# Patient Record
Sex: FEMALE | Race: WHITE | NOT HISPANIC OR LATINO | Employment: OTHER | URBAN - NONMETROPOLITAN AREA
[De-identification: names, ages, dates, MRNs, and addresses within clinical notes are randomized per-mention and may not be internally consistent; named-entity substitution may affect disease eponyms.]

---

## 2018-02-20 ENCOUNTER — HISTORICAL (OUTPATIENT)
Dept: ADMINISTRATIVE | Facility: HOSPITAL | Age: 83
End: 2018-02-20

## 2018-04-02 ENCOUNTER — HISTORICAL (OUTPATIENT)
Dept: ADMINISTRATIVE | Facility: HOSPITAL | Age: 83
End: 2018-04-02

## 2018-05-18 ENCOUNTER — HISTORICAL (OUTPATIENT)
Dept: ADMINISTRATIVE | Facility: HOSPITAL | Age: 83
End: 2018-05-18

## 2020-08-24 ENCOUNTER — HISTORICAL (OUTPATIENT)
Dept: ADMINISTRATIVE | Facility: HOSPITAL | Age: 85
End: 2020-08-24

## 2020-08-25 ENCOUNTER — HISTORICAL (OUTPATIENT)
Dept: ADMINISTRATIVE | Facility: HOSPITAL | Age: 85
End: 2020-08-25

## 2020-08-28 ENCOUNTER — HISTORICAL (OUTPATIENT)
Dept: ADMINISTRATIVE | Facility: HOSPITAL | Age: 85
End: 2020-08-28

## 2020-09-08 ENCOUNTER — HISTORICAL (OUTPATIENT)
Dept: ADMINISTRATIVE | Facility: HOSPITAL | Age: 85
End: 2020-09-08

## 2021-03-14 ENCOUNTER — HISTORICAL (OUTPATIENT)
Dept: ADMINISTRATIVE | Facility: HOSPITAL | Age: 86
End: 2021-03-14

## 2021-03-15 ENCOUNTER — HISTORICAL (OUTPATIENT)
Dept: ADMINISTRATIVE | Facility: HOSPITAL | Age: 86
End: 2021-03-15

## 2021-06-03 LAB
ALBUMIN SERPL-MCNC: 3.9 G/DL (ref 3.4–5)
ALBUMIN/GLOB SERPL: 1.3 {RATIO}
ALP SERPL-CCNC: 74 U/L (ref 50–144)
ALT SERPL-CCNC: 16 U/L (ref 1–45)
ANION GAP SERPL CALC-SCNC: 10 MMOL/L (ref 7–16)
AST SERPL-CCNC: 23 U/L (ref 14–36)
BASOPHILS # BLD AUTO: 0.03 X10(3)/MCL (ref 0.01–0.08)
BASOPHILS NFR BLD AUTO: 0.3 % (ref 0.1–1.2)
BILIRUB SERPL-MCNC: 0.54 MG/DL (ref 0.1–1)
BUN SERPL-MCNC: 36 MG/DL (ref 7–20)
CALCIUM SERPL-MCNC: 9.2 MG/DL (ref 8.4–10.2)
CHLORIDE SERPL-SCNC: 102 MMOL/L (ref 94–110)
CHOLEST SERPL-MCNC: 127 MG/DL (ref 0–200)
CO2 SERPL-SCNC: 26 MMOL/L (ref 21–32)
CREAT SERPL-MCNC: 1.1 MG/DL (ref 0.52–1.04)
CREAT/UREA NIT SERPL: 32.7 (ref 12–20)
EOSINOPHIL # BLD AUTO: 0.42 X10(3)/MCL (ref 0.04–0.36)
EOSINOPHIL NFR BLD AUTO: 4.6 % (ref 0.7–7)
ERYTHROCYTE [DISTWIDTH] IN BLOOD BY AUTOMATED COUNT: 15 % (ref 11–14.5)
EST CREAT CLEARANCE SER (OHS): 43.86 ML/MIN
GLOBULIN SER-MCNC: 3 G/DL (ref 2–3.9)
GLUCOSE SERPL-MCNC: 176 MGM./DL (ref 70–115)
HCT VFR BLD AUTO: 38.6 % (ref 36–48)
HDLC SERPL-MCNC: 43 MG/DL (ref 40–60)
HGB BLD-MCNC: 11.9 G/DL (ref 11.8–16)
IMM GRANULOCYTES # BLD AUTO: 0.04 X10E3/UL (ref 0–0.03)
IMM GRANULOCYTES NFR BLD AUTO: 0.4 % (ref 0–0.5)
LDLC SERPL CALC-MCNC: 54 MG/DL (ref 30–100)
LYMPHOCYTES # BLD AUTO: 2.21 X10(3)/MCL (ref 1.16–3.74)
LYMPHOCYTES NFR BLD AUTO: 24.4 % (ref 20–55)
MCH RBC QN AUTO: 27.8 PG (ref 27–34)
MCHC RBC AUTO-ENTMCNC: 30.8 G/DL (ref 31–37)
MCV RBC AUTO: 90.2 FL (ref 79–99)
MONOCYTES # BLD AUTO: 0.57 X10(3)/MCL (ref 0.24–0.36)
MONOCYTES NFR BLD AUTO: 6.3 % (ref 4.7–12.5)
NEUTROPHILS # BLD AUTO: 5.77 X10(3)/MCL (ref 1.56–6.13)
NEUTROPHILS NFR BLD AUTO: 64 % (ref 37–73)
PLATELET # BLD AUTO: 206 X10(3)/MCL (ref 140–371)
PMV BLD AUTO: 10.7 FL (ref 9.4–12.4)
POTASSIUM SERPL-SCNC: 4.2 MMOL/L (ref 3.5–5.1)
PROT SERPL-MCNC: 6.9 G/DL (ref 6.3–8.2)
RBC # BLD AUTO: 4.28 X10(6)/MCL (ref 4–5.1)
SODIUM SERPL-SCNC: 138 MMOL/L (ref 135–145)
TRIGL SERPL-MCNC: 107 MG/DL (ref 30–200)
TSH SERPL-ACNC: 5.41 UIU/ML (ref 0.36–3.74)
WBC # SPEC AUTO: 9 X10(3)/MCL (ref 4–11.5)

## 2021-09-15 LAB
EST. AVERAGE GLUCOSE BLD GHB EST-MCNC: 153 MG/DL (ref 70–115)
HBA1C MFR BLD: 7.1 % (ref 4–6)

## 2022-04-10 ENCOUNTER — HISTORICAL (OUTPATIENT)
Dept: ADMINISTRATIVE | Facility: HOSPITAL | Age: 87
End: 2022-04-10

## 2022-04-27 VITALS
WEIGHT: 170.88 LBS | OXYGEN SATURATION: 97 % | DIASTOLIC BLOOD PRESSURE: 56 MMHG | SYSTOLIC BLOOD PRESSURE: 130 MMHG | BODY MASS INDEX: 28.47 KG/M2 | HEIGHT: 65 IN

## 2022-05-01 ENCOUNTER — HISTORICAL (OUTPATIENT)
Dept: ADMINISTRATIVE | Facility: HOSPITAL | Age: 87
End: 2022-05-01

## 2022-05-03 NOTE — HISTORICAL [facility]
This is a historical note converted from Kelin. Formatting and pictures may have been removed.  Please reference Kelin for original formatting and attached multimedia. Chief Complaint  follow up, itching all over  History of Present Illness  87-year-old female here for follow-up of chronic medical conditions. ?See assessment plan for individual list of issues  Review of Systems  ? She has not been having any chest pain?since her hospitalization a few months back.  Physical Exam  Vitals & Measurements  T:?36.5? ?C (Oral)? HR:?76(Peripheral)? BP:?130/62? SpO2:?98%?  HT:?165.00?cm? WT:?77.100?kg? BMI:?28.32?  Elderly female in no distress  Heart with regular rate and rhythm  Assessment/Plan  1.?CVA, old, hemiparesis ? I69.359  ?She is on statin therapy along with Plavix and has not had any recent TIA type symptoms  2.?Diabetes ? E11.9  ?Metformin. ?We will check an A1c today, previous A1cs have been well controlled  3.?HTN - Hypertension ? I10  ?She is currently on?hydrochlorothiazide?and quinapril, blood pressure well controlled  4.?Hypothyroidism ? E03.9  ?She is on levothyroxine 75 mcg daily. ?We will check a TSH today  5.?Hyperlipidemia ? E78.5  ?On atorvastatin 10 mg daily  6.?Small bowel obstruction ? K56.609  ?She has not had any more bowel obstruction symptoms in the last 6 months.? She is on a puréed diet now because of some problems with her dentures and this is been having her bowels move very well lately.  7.?Anxiety ? F41.9  ?She has been a little more anxious lately?can still on her Zoloft?100 mg nightly along with mirtazapine 7.5 mg at night  Orders:  mirtazapine, See Instructions, TAKE 1 TABLET BY MOUTH EVERYDAY AT BEDTIME, # 90 tab(s), 1 Refill(s), Pharmacy: [id number], 165, cm, Height/Length Dosing, 06/03/21 11:45:00 CDT, 77.1, kg, Weight Dosing, 06/03/21 11:45:00 CDT  Clinic Follow up, *Est. 09/03/21 10:00:00 CDT, Order for future visit, Hyperlipidemia, [facility]  Office/Outpatient Visit Level 4 Established [id number] PC, CVA, old, hemiparesis  Diabetes  HTN - Hypertension  Hypothyroidism  Hyperlipidemia, [facility], 06/03/21 11:55:00 CDT  CBC, CMP, TSH, lipids, hemoglobin A1c today  Follow-up in 3 months  Referrals  Clinic Follow up, *Est. 09/03/21 10:00:00 CDT, Order for future visit, Hyperlipidemia, [facility]   Problem List/Past Medical History  Ongoing  Anxiety  Arthritis  Chest pain  CVA, old, hemiparesis  Diabetes  Heartburn  Hemiplegia of left nondominant side  HTN - Hypertension  Hyperlipidemia  Hypothyroidism  Recurrent major depressive disorder  Seborrheic keratosis  Small bowel obstruction  Historical  No qualifying data  Procedure/Surgical History  Implantable neurostimulator, pulse generator, any type (05/25/2016)  Insertion of Single Array Stimulator Generator into Back Subcutaneous Tissue and Fascia, Open Approach (05/25/2016)  Insertion or replacement of peripheral or gastric neurostimulator pulse generator or , direct or inductive coupling (05/25/2016)  Sacral Neuromodulation Complete (None) (05/25/2016)  Excision of basal cell carcinoma (2016)  Suspension of bladder (2016)  Incision and exploration of kidney (1992)  Appendectomy;  Biopsy of breast; open, incisional  Cholecystectomy;  Cholecystectomy;  Extracapsular cataract removal with insertion of intraocular lens prosthesis (1 stage procedure), manual or mechanical technique (eg, irrigation and aspiration or phacoemulsification)  Laparoscopy, surgical, with total hysterectomy, for uterus 250 g or less;   Medications  amlodipine 5 mg oral tablet, See Instructions  atorvastatin 10 mg oral tablet, See Instructions  clopidogrel 75 mg oral tablet, See Instructions  glipiZIDE 10 mg oral tablet, See Instructions, 1 refills  hydrochlorothiazide 25 mg oral tablet, See Instructions  levocetirizine 5 mg tablet, 5 mg= 1 tab(s), Oral, Daily  levothyroxine 75 mcg  (0.075 mg) oral tablet, See Instructions, 1 refills  METFORMIN TAB 500MG ER, 500 mg= 1 tab(s), Oral, Daily  MiraLax (polyethylene glycol 3350), 17 gm, Oral, BID  mirtazapine 7.5 mg oral tablet, See Instructions, 1 refills  Pantoprazole 40 mg ORAL EC-Tablet, 40 mg= 1 tab(s), Oral, Daily, 1 refills  potassium chloride 20 mEq oral TABLET extended release, 20 mEq, Oral, BID  quinapril 20 mg oral tablet, 20 mg= 1 tab(s), Oral, BID, 1 refills  sertraline 100 mg oral tablet, See Instructions  spironolactone 25 mg oral tablet, 25 mg= 1 tab(s), Oral, Daily, 1 refills  traZODONE 50 mg oral tablet ( Desyrel ), 50 mg= 1 tab(s), Oral, Once a day (at bedtime), 1 refills  Allergies  Clindamycin Hydrochloride?(Unknown)  Povidone-Iodine  contrast media (iodine-based)?(Unknown)  Social History  Abuse/Neglect  No, 03/24/2021  Alcohol  Never, 05/23/2016  Home/Environment  Lives with Significant other., 05/23/2016  Substance Use  Never, 05/23/2016  Tobacco  Never (less than 100 in lifetime), N/A, 03/24/2021  Never smoker, 05/23/2016  Family History  Diabetes mellitus type 1.: Negative: Mother.  Diabetes mellitus type 2: Mother and Mother.  Heart attack: Mother and Father.  Heart failure.: Mother and Father.  Immunizations  Vaccine Date Status Comments   COVID-19 mRNA, LNP-S, PF - Moderna 02/13/2021 Recorded    COVID-19 mRNA, LNP-S, PF - Moderna 01/16/2021 Recorded    influenza virus vaccine, inactivated 10/01/2020 Given    influenza virus vaccine, inactivated 10/02/2019 Recorded    influenza virus vaccine, inactivated 09/20/2018 Recorded    influenza virus vaccine, inactivated 09/05/2017 Recorded    pneumococcal 13-valent conjugate vaccine 10/20/2016 Recorded    influenza virus vaccine, inactivated 09/02/2016 Recorded    influenza virus vaccine, inactivated 09/09/2015 Recorded    influenza virus vaccine, inactivated 09/01/2014 Recorded    zoster vaccine live 07/26/2013 Recorded    influenza virus vaccine, inactivated 10/10/2011 Recorded  2020-10-01: UNKNOWN CAMPAIGNID   Health Maintenance  Health Maintenance  ???Pending?(in the next year)  ??? ??Due?  ??? ? ? ?ADL Screening due??06/03/21??and every 1??year(s)  ??? ? ? ?Bone Density Screening due??06/03/21??Variable frequency  ??? ? ? ?Diabetes Maintenance-Foot Exam due??06/03/21??Unknown Frequency  ??? ? ? ?Hypertension Management-Education due??06/03/21??and every 1??year(s)  ??? ? ? ?Medicare Annual Wellness Exam due??06/03/21??and every 1??year(s)  ??? ? ? ?Tetanus Vaccine due??06/03/21??and every 10??year(s)  ??? ? ? ?Zoster Vaccine due??06/03/21??Unknown Frequency  ??? ??Due In Future?  ??? ? ? ?Diabetes Maintenance-HgbA1c not due until??09/04/21??and every 1??year(s)  ??? ? ? ?Influenza Vaccine not due until??10/01/21??and every 1??day(s)  ??? ? ? ?Diabetes Maintenance-Eye Exam not due until??10/19/21??and every 1??year(s)  ??? ? ? ?Obesity Screening not due until??01/01/22??and every 1??year(s)  ??? ? ? ?Advance Directive not due until??01/02/22??and every 1??year(s)  ??? ? ? ?Cognitive Screening not due until??01/02/22??and every 1??year(s)  ??? ? ? ?Fall Risk Assessment not due until??01/02/22??and every 1??year(s)  ??? ? ? ?Functional Assessment not due until??01/02/22??and every 1??year(s)  ??? ? ? ?Hypertension Management-BMP not due until??03/15/22??and every 1??year(s)  ???Satisfied?(in the past 1 year)  ??? ??Satisfied?  ??? ? ? ?Advance Directive on??06/03/21.??Satisfied by Lsia Melgar LPN  ??? ? ? ?Blood Pressure Screening on??06/03/21.??Satisfied by Lisa Melgar LPN  ??? ? ? ?Body Mass Index Check on??06/03/21.??Satisfied by Lisa Melgar LPN  ??? ? ? ?Cognitive Screening on??06/03/21.??Satisfied by Lisa Melgar LPN  ??? ? ? ?Coronary Artery Disease Maintenance-Lipid Lowering Therapy on??02/22/21.??Satisfied by Mitchel Stewart MD  ??? ? ? ?Depression Screening on??06/03/21.??Satisfied by Lisa Melgar LPN  ??? ? ? ?Fall Risk Assessment  on??06/03/21.??Satisfied by Lisa Melgar LPN  ??? ? ? ?Functional Assessment on??06/03/21.??Satisfied by Lisa Melgar LPN  ??? ? ? ?Hypertension Management-Blood Pressure on??06/03/21.??Satisfied by Lisa Melgar LPN  ??? ? ? ?Influenza Vaccine on??10/01/20.??Satisfied by Thais Melgar  ??? ? ? ?Obesity Screening on??06/03/21.??Satisfied by Lisa Melgar LPN  ?

## 2023-02-13 DIAGNOSIS — F33.9 EPISODE OF RECURRENT MAJOR DEPRESSIVE DISORDER, UNSPECIFIED DEPRESSION EPISODE SEVERITY: ICD-10-CM

## 2023-02-13 DIAGNOSIS — E78.5 HYPERLIPIDEMIA, UNSPECIFIED HYPERLIPIDEMIA TYPE: ICD-10-CM

## 2023-02-13 DIAGNOSIS — F41.9 ANXIETY: Primary | ICD-10-CM

## 2023-02-13 RX ORDER — SERTRALINE HYDROCHLORIDE 100 MG/1
100 TABLET, FILM COATED ORAL DAILY
Qty: 90 TABLET | Refills: 0 | Status: SHIPPED | OUTPATIENT
Start: 2023-02-13 | End: 2023-05-12

## 2023-02-13 RX ORDER — ATORVASTATIN CALCIUM 10 MG/1
TABLET, FILM COATED ORAL
COMMUNITY
Start: 2022-11-02 | End: 2023-02-13 | Stop reason: SDUPTHER

## 2023-02-13 RX ORDER — MIRTAZAPINE 15 MG/1
TABLET, FILM COATED ORAL
COMMUNITY
Start: 2022-08-30 | End: 2023-02-13 | Stop reason: SDUPTHER

## 2023-02-13 RX ORDER — MIRTAZAPINE 15 MG/1
15 TABLET, FILM COATED ORAL NIGHTLY
Qty: 90 TABLET | Refills: 0 | Status: SHIPPED | OUTPATIENT
Start: 2023-02-13 | End: 2023-05-22

## 2023-02-13 RX ORDER — ATORVASTATIN CALCIUM 10 MG/1
10 TABLET, FILM COATED ORAL NIGHTLY
Qty: 90 TABLET | Refills: 0 | Status: SHIPPED | OUTPATIENT
Start: 2023-02-13 | End: 2023-05-12

## 2023-02-13 RX ORDER — SERTRALINE HYDROCHLORIDE 100 MG/1
TABLET, FILM COATED ORAL
COMMUNITY
Start: 2022-11-02 | End: 2023-02-13 | Stop reason: SDUPTHER

## 2023-03-14 ENCOUNTER — LAB REQUISITION (OUTPATIENT)
Dept: LAB | Facility: HOSPITAL | Age: 88
End: 2023-03-14
Payer: MEDICARE

## 2023-03-14 DIAGNOSIS — E11.9 TYPE 2 DIABETES MELLITUS WITHOUT COMPLICATIONS: ICD-10-CM

## 2023-03-14 LAB
EST. AVERAGE GLUCOSE BLD GHB EST-MCNC: 171.4 MG/DL (ref 70–115)
HBA1C MFR BLD: 7.6 % (ref 4–6)

## 2023-03-14 PROCEDURE — 83036 HEMOGLOBIN GLYCOSYLATED A1C: CPT | Performed by: FAMILY MEDICINE

## 2023-04-04 ENCOUNTER — OFFICE VISIT (OUTPATIENT)
Dept: FAMILY MEDICINE | Facility: CLINIC | Age: 88
End: 2023-04-04
Payer: MEDICARE

## 2023-04-04 VITALS
TEMPERATURE: 98 F | BODY MASS INDEX: 27.86 KG/M2 | HEART RATE: 86 BPM | WEIGHT: 167.19 LBS | OXYGEN SATURATION: 97 % | DIASTOLIC BLOOD PRESSURE: 58 MMHG | HEIGHT: 65 IN | SYSTOLIC BLOOD PRESSURE: 130 MMHG

## 2023-04-04 DIAGNOSIS — E78.2 MIXED HYPERLIPIDEMIA: ICD-10-CM

## 2023-04-04 DIAGNOSIS — E11.9 TYPE 2 DIABETES MELLITUS WITHOUT COMPLICATION, WITHOUT LONG-TERM CURRENT USE OF INSULIN: ICD-10-CM

## 2023-04-04 DIAGNOSIS — I69.359 HEMIPARESIS AS LATE EFFECT OF CEREBROVASCULAR ACCIDENT (CVA): Primary | ICD-10-CM

## 2023-04-04 DIAGNOSIS — K21.9 GASTROESOPHAGEAL REFLUX DISEASE, UNSPECIFIED WHETHER ESOPHAGITIS PRESENT: Primary | ICD-10-CM

## 2023-04-04 DIAGNOSIS — R26.0 ATAXIC GAIT: ICD-10-CM

## 2023-04-04 DIAGNOSIS — E03.9 ACQUIRED HYPOTHYROIDISM: ICD-10-CM

## 2023-04-04 DIAGNOSIS — I10 PRIMARY HYPERTENSION: ICD-10-CM

## 2023-04-04 DIAGNOSIS — F41.9 ANXIETY: ICD-10-CM

## 2023-04-04 DIAGNOSIS — F33.41 RECURRENT MAJOR DEPRESSIVE DISORDER, IN PARTIAL REMISSION: ICD-10-CM

## 2023-04-04 DIAGNOSIS — E55.9 VITAMIN D DEFICIENCY: ICD-10-CM

## 2023-04-04 DIAGNOSIS — I69.354 HEMIPLEGIA OF LEFT NONDOMINANT SIDE AS LATE EFFECT OF CEREBRAL INFARCTION, UNSPECIFIED HEMIPLEGIA TYPE: ICD-10-CM

## 2023-04-04 PROBLEM — F33.9 RECURRENT MAJOR DEPRESSIVE DISORDER: Status: ACTIVE | Noted: 2023-04-04

## 2023-04-04 PROBLEM — E78.5 HYPERLIPIDEMIA: Status: ACTIVE | Noted: 2023-04-04

## 2023-04-04 PROBLEM — G81.94 HEMIPLEGIA AFFECTING LEFT NONDOMINANT SIDE: Status: ACTIVE | Noted: 2023-04-04

## 2023-04-04 PROCEDURE — 99214 OFFICE O/P EST MOD 30 MIN: CPT | Mod: ,,, | Performed by: FAMILY MEDICINE

## 2023-04-04 PROCEDURE — 99214 PR OFFICE/OUTPT VISIT, EST, LEVL IV, 30-39 MIN: ICD-10-PCS | Mod: ,,, | Performed by: FAMILY MEDICINE

## 2023-04-04 RX ORDER — AMLODIPINE BESYLATE 5 MG/1
1 TABLET ORAL DAILY
COMMUNITY
Start: 2023-02-20 | End: 2023-04-24 | Stop reason: SDUPTHER

## 2023-04-04 RX ORDER — LEVOTHYROXINE SODIUM 75 UG/1
1 TABLET ORAL DAILY
COMMUNITY
Start: 2021-12-13 | End: 2023-05-12

## 2023-04-04 RX ORDER — CLOPIDOGREL BISULFATE 75 MG/1
1 TABLET ORAL DAILY
COMMUNITY
Start: 2022-11-02

## 2023-04-04 RX ORDER — TRAZODONE HYDROCHLORIDE 50 MG/1
50 TABLET ORAL NIGHTLY
COMMUNITY
Start: 2022-04-11 | End: 2023-04-24 | Stop reason: SDUPTHER

## 2023-04-04 RX ORDER — PANTOPRAZOLE SODIUM 40 MG/1
40 TABLET, DELAYED RELEASE ORAL DAILY
COMMUNITY
Start: 2022-04-01 | End: 2023-04-04 | Stop reason: SDUPTHER

## 2023-04-04 RX ORDER — SPIRONOLACTONE 25 MG/1
25 TABLET ORAL DAILY
COMMUNITY
Start: 2022-05-03 | End: 2023-04-25

## 2023-04-04 RX ORDER — PANTOPRAZOLE SODIUM 40 MG/1
40 TABLET, DELAYED RELEASE ORAL DAILY
Qty: 90 TABLET | Refills: 3 | Status: SHIPPED | OUTPATIENT
Start: 2023-04-04

## 2023-04-04 NOTE — PROGRESS NOTES
"SUBJECTIVE:  Mariajose Allen is a 89 y.o. female here for 3 month f/u      HPI  Here for follow-up on chronic medical conditions.  See assessment and plan for individual list of issues.  Mariajose's allergies, medications, history, and problem list were updated as appropriate.    Review of Systems   He is doing pretty well at this time.  No real complaints.  She says she has the usual problems of a 89-year-old.    No results found for this or any previous visit (from the past 504 hour(s)).    OBJECTIVE:  Vital signs  Vitals:    04/04/23 1356 04/04/23 1427   BP: (!) 160/64 (!) 130/58   BP Location: Right arm Right arm   Pulse: 86    Temp: 98 °F (36.7 °C)    TempSrc: Oral    SpO2: 97%    Weight: 75.8 kg (167 lb 3.2 oz)    Height: 5' 4.57" (1.64 m)         Physical Exam heart with a regular rate and rhythm, lungs are clear.  She is able to walk with the assistance of a walker today    ASSESSMENT/PLAN:  1. Hemiparesis as late effect of cerebrovascular accident (CVA)  On statin therapy along with Plavix    2. 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 Recurrent major depressive disorder, in partial remission  Currently on mirtazapine and Zoloft 100 mg daily.  We will try reducing the Zoloft to 50 mg daily and see how she does with this and continue the mirtazapine 15 mg at night    4. Anxiety  Stable    5. Mixed hyperlipidemia  On atorvastatin  Overview:  Lab Results   Component Value Date    CHOL 127 06/03/2021    HDL 43 06/03/2021    TRIG 107 06/03/2021         6. Primary hypertension  Blood pressure well controlled on amlodipine and lisinopril and spironolactone    7. Type 2 diabetes mellitus without complication, without long-term current use of insulin  A1c 7.6 which is very good for her age.  She is on glipizide which is not the best option for someone her age but she is done well with it with no signs of hypoglycemia and so this will be continued for " now  Overview:  Lab Results   Component Value Date    HGBA1C 7.6 (H) 03/14/2023    HGBA1C 7.1 (H) 09/15/2021          8. Acquired hypothyroidism  On levothyroxine 75 mcg daily         Follow Up:  Follow up in about 3 months (around 7/4/2023) for recheck, nonfasting labs.

## 2023-04-24 DIAGNOSIS — I10 PRIMARY HYPERTENSION: ICD-10-CM

## 2023-04-24 DIAGNOSIS — F41.9 ANXIETY: Primary | ICD-10-CM

## 2023-04-24 RX ORDER — TRAZODONE HYDROCHLORIDE 50 MG/1
50 TABLET ORAL NIGHTLY
Qty: 90 TABLET | Refills: 1 | Status: SHIPPED | OUTPATIENT
Start: 2023-04-24

## 2023-04-24 RX ORDER — AMLODIPINE BESYLATE 5 MG/1
5 TABLET ORAL DAILY
Qty: 90 TABLET | Refills: 1 | Status: SHIPPED | OUTPATIENT
Start: 2023-04-24

## 2023-05-12 DIAGNOSIS — E03.9 ACQUIRED HYPOTHYROIDISM: Primary | ICD-10-CM

## 2023-05-12 DIAGNOSIS — E78.5 HYPERLIPIDEMIA, UNSPECIFIED HYPERLIPIDEMIA TYPE: ICD-10-CM

## 2023-05-12 DIAGNOSIS — F41.9 ANXIETY: ICD-10-CM

## 2023-05-12 RX ORDER — LEVOTHYROXINE SODIUM 75 UG/1
TABLET ORAL
Qty: 90 TABLET | Refills: 1 | Status: SHIPPED | OUTPATIENT
Start: 2023-05-12

## 2023-05-12 RX ORDER — ATORVASTATIN CALCIUM 10 MG/1
TABLET, FILM COATED ORAL
Qty: 90 TABLET | Refills: 0 | Status: SHIPPED | OUTPATIENT
Start: 2023-05-12 | End: 2023-08-08

## 2023-05-12 RX ORDER — SERTRALINE HYDROCHLORIDE 100 MG/1
TABLET, FILM COATED ORAL
Qty: 90 TABLET | Refills: 0 | Status: SHIPPED | OUTPATIENT
Start: 2023-05-12 | End: 2023-06-05

## 2023-05-22 DIAGNOSIS — F33.9 EPISODE OF RECURRENT MAJOR DEPRESSIVE DISORDER, UNSPECIFIED DEPRESSION EPISODE SEVERITY: ICD-10-CM

## 2023-05-22 RX ORDER — MIRTAZAPINE 15 MG/1
TABLET, FILM COATED ORAL
Qty: 90 TABLET | Refills: 0 | Status: SHIPPED | OUTPATIENT
Start: 2023-05-22 | End: 2023-08-21

## 2023-05-24 ENCOUNTER — TELEPHONE (OUTPATIENT)
Dept: FAMILY MEDICINE | Facility: CLINIC | Age: 88
End: 2023-05-24
Payer: MEDICARE

## 2023-05-24 NOTE — TELEPHONE ENCOUNTER
Misty called stating pt has had abd cramping for a whole week. No other symptoms.         [phone]

## 2023-06-04 DIAGNOSIS — F41.9 ANXIETY: ICD-10-CM

## 2023-06-05 RX ORDER — SERTRALINE HYDROCHLORIDE 50 MG/1
50 TABLET, FILM COATED ORAL DAILY
Qty: 90 TABLET | Refills: 1 | Status: SHIPPED | OUTPATIENT
Start: 2023-06-05 | End: 2023-07-18

## 2023-06-21 ENCOUNTER — LAB REQUISITION (OUTPATIENT)
Dept: LAB | Facility: HOSPITAL | Age: 88
End: 2023-06-21
Payer: MEDICARE

## 2023-06-21 DIAGNOSIS — E11.9 TYPE 2 DIABETES MELLITUS WITHOUT COMPLICATIONS: ICD-10-CM

## 2023-06-21 LAB
EST. AVERAGE GLUCOSE BLD GHB EST-MCNC: 234.6 MG/DL (ref 70–115)
HBA1C MFR BLD: 9.8 % (ref 4–6)

## 2023-06-21 PROCEDURE — 83036 HEMOGLOBIN GLYCOSYLATED A1C: CPT | Performed by: FAMILY MEDICINE

## 2023-07-10 ENCOUNTER — TELEPHONE (OUTPATIENT)
Dept: FAMILY MEDICINE | Facility: CLINIC | Age: 88
End: 2023-07-10
Payer: MEDICARE

## 2023-07-17 ENCOUNTER — LAB REQUISITION (OUTPATIENT)
Dept: LAB | Facility: HOSPITAL | Age: 88
End: 2023-07-17
Payer: MEDICARE

## 2023-07-17 DIAGNOSIS — E11.9 TYPE 2 DIABETES MELLITUS WITHOUT COMPLICATIONS: ICD-10-CM

## 2023-07-17 DIAGNOSIS — I10 ESSENTIAL (PRIMARY) HYPERTENSION: ICD-10-CM

## 2023-07-17 LAB
ALBUMIN SERPL-MCNC: 3.5 G/DL (ref 3.4–5)
ALBUMIN/GLOB SERPL: 1.3 RATIO
ALP SERPL-CCNC: 69 UNIT/L (ref 50–144)
ALT SERPL-CCNC: 15 UNIT/L (ref 1–45)
ANION GAP SERPL CALC-SCNC: 4 MEQ/L (ref 2–13)
AST SERPL-CCNC: 21 UNIT/L (ref 14–36)
BASOPHILS # BLD AUTO: 0.05 X10(3)/MCL (ref 0.01–0.08)
BASOPHILS NFR BLD AUTO: 0.6 % (ref 0.1–1.2)
BILIRUBIN DIRECT+TOT PNL SERPL-MCNC: 0.3 MG/DL (ref 0–1)
BUN SERPL-MCNC: 33 MG/DL (ref 7–20)
CALCIUM SERPL-MCNC: 8.9 MG/DL (ref 8.4–10.2)
CHLORIDE SERPL-SCNC: 104 MMOL/L (ref 98–110)
CHOLEST SERPL-MCNC: 97 MG/DL (ref 0–200)
CO2 SERPL-SCNC: 28 MMOL/L (ref 21–32)
CREAT SERPL-MCNC: 1.44 MG/DL (ref 0.66–1.25)
CREAT/UREA NIT SERPL: 23 (ref 12–20)
DEPRECATED CALCIDIOL+CALCIFEROL SERPL-MC: 22.4 NG/ML (ref 30–100)
EOSINOPHIL # BLD AUTO: 0.34 X10(3)/MCL (ref 0.04–0.36)
EOSINOPHIL NFR BLD AUTO: 3.8 % (ref 0.7–7)
ERYTHROCYTE [DISTWIDTH] IN BLOOD BY AUTOMATED COUNT: 13.5 % (ref 11–14.5)
EST. AVERAGE GLUCOSE BLD GHB EST-MCNC: 217.3 MG/DL (ref 70–115)
GFR SERPLBLD CREATININE-BSD FMLA CKD-EPI: 35 MLS/MIN/1.73/M2
GLOBULIN SER-MCNC: 2.7 GM/DL (ref 2–3.9)
GLUCOSE SERPL-MCNC: 232 MG/DL (ref 70–115)
HBA1C MFR BLD: 9.2 % (ref 4–6)
HCT VFR BLD AUTO: 34 % (ref 36–48)
HDLC SERPL-MCNC: 41 MG/DL (ref 40–60)
HGB BLD-MCNC: 11.2 G/DL (ref 11.8–16)
IMM GRANULOCYTES # BLD AUTO: 0.03 X10(3)/MCL (ref 0–0.03)
IMM GRANULOCYTES NFR BLD AUTO: 0.3 % (ref 0–0.5)
LDLC SERPL DIRECT ASSAY-SCNC: 39.9 MG/DL (ref 30–100)
LYMPHOCYTES # BLD AUTO: 3.2 X10(3)/MCL (ref 1.16–3.74)
LYMPHOCYTES NFR BLD AUTO: 36.1 % (ref 20–55)
MCH RBC QN AUTO: 29.6 PG (ref 27–34)
MCHC RBC AUTO-ENTMCNC: 32.9 G/DL (ref 31–37)
MCV RBC AUTO: 89.7 FL (ref 79–99)
MONOCYTES # BLD AUTO: 0.6 X10(3)/MCL (ref 0.24–0.36)
MONOCYTES NFR BLD AUTO: 6.8 % (ref 4.7–12.5)
NEUTROPHILS # BLD AUTO: 4.65 X10(3)/MCL (ref 1.56–6.13)
NEUTROPHILS NFR BLD AUTO: 52.4 % (ref 37–73)
NRBC BLD AUTO-RTO: 0 %
PLATELET # BLD AUTO: 202 X10(3)/MCL (ref 140–371)
PMV BLD AUTO: 10.2 FL (ref 9.4–12.4)
POTASSIUM SERPL-SCNC: 5.4 MMOL/L (ref 3.5–5.1)
PROT SERPL-MCNC: 6.2 GM/DL (ref 6.3–8.2)
RBC # BLD AUTO: 3.79 X10(6)/MCL (ref 4–5.1)
SODIUM SERPL-SCNC: 136 MMOL/L (ref 135–145)
TRIGL SERPL-MCNC: 130 MG/DL (ref 30–200)
TSH SERPL-ACNC: 4.43 UIU/ML (ref 0.36–3.74)
VIT B12 SERPL-MCNC: 249 PG/ML (ref 211–946)
WBC # SPEC AUTO: 8.87 X10(3)/MCL (ref 4–11.5)

## 2023-07-17 PROCEDURE — 82607 VITAMIN B-12: CPT | Performed by: FAMILY MEDICINE

## 2023-07-17 PROCEDURE — 80061 LIPID PANEL: CPT | Performed by: FAMILY MEDICINE

## 2023-07-17 PROCEDURE — 84443 ASSAY THYROID STIM HORMONE: CPT | Performed by: FAMILY MEDICINE

## 2023-07-17 PROCEDURE — 85025 COMPLETE CBC W/AUTO DIFF WBC: CPT | Performed by: FAMILY MEDICINE

## 2023-07-17 PROCEDURE — 82306 VITAMIN D 25 HYDROXY: CPT | Performed by: FAMILY MEDICINE

## 2023-07-17 PROCEDURE — 83036 HEMOGLOBIN GLYCOSYLATED A1C: CPT | Performed by: FAMILY MEDICINE

## 2023-07-17 PROCEDURE — 80053 COMPREHEN METABOLIC PANEL: CPT | Performed by: FAMILY MEDICINE

## 2023-07-18 ENCOUNTER — OFFICE VISIT (OUTPATIENT)
Dept: FAMILY MEDICINE | Facility: CLINIC | Age: 88
End: 2023-07-18
Payer: MEDICARE

## 2023-07-18 VITALS
HEART RATE: 94 BPM | SYSTOLIC BLOOD PRESSURE: 128 MMHG | WEIGHT: 157 LBS | DIASTOLIC BLOOD PRESSURE: 54 MMHG | HEIGHT: 65 IN | BODY MASS INDEX: 26.16 KG/M2 | OXYGEN SATURATION: 97 %

## 2023-07-18 DIAGNOSIS — E11.9 TYPE 2 DIABETES MELLITUS WITHOUT COMPLICATION, WITHOUT LONG-TERM CURRENT USE OF INSULIN: ICD-10-CM

## 2023-07-18 DIAGNOSIS — N18.32 STAGE 3B CHRONIC KIDNEY DISEASE: ICD-10-CM

## 2023-07-18 DIAGNOSIS — S31.000A WOUND OF SACRAL REGION, INITIAL ENCOUNTER: ICD-10-CM

## 2023-07-18 DIAGNOSIS — F33.41 RECURRENT MAJOR DEPRESSIVE DISORDER, IN PARTIAL REMISSION: ICD-10-CM

## 2023-07-18 DIAGNOSIS — E11.51 TYPE 2 DIABETES MELLITUS WITH DIABETIC PERIPHERAL ANGIOPATHY WITHOUT GANGRENE, WITHOUT LONG-TERM CURRENT USE OF INSULIN: ICD-10-CM

## 2023-07-18 DIAGNOSIS — F41.9 ANXIETY: ICD-10-CM

## 2023-07-18 DIAGNOSIS — I10 PRIMARY HYPERTENSION: ICD-10-CM

## 2023-07-18 DIAGNOSIS — E03.9 ACQUIRED HYPOTHYROIDISM: ICD-10-CM

## 2023-07-18 DIAGNOSIS — I69.359 HEMIPARESIS AS LATE EFFECT OF CEREBROVASCULAR ACCIDENT (CVA): Primary | ICD-10-CM

## 2023-07-18 DIAGNOSIS — I69.354 HEMIPLEGIA OF LEFT NONDOMINANT SIDE AS LATE EFFECT OF CEREBRAL INFARCTION, UNSPECIFIED HEMIPLEGIA TYPE: ICD-10-CM

## 2023-07-18 DIAGNOSIS — E11.9 TYPE 2 DIABETES MELLITUS WITHOUT COMPLICATION, UNSPECIFIED WHETHER LONG TERM INSULIN USE: ICD-10-CM

## 2023-07-18 DIAGNOSIS — E78.2 MIXED HYPERLIPIDEMIA: ICD-10-CM

## 2023-07-18 PROCEDURE — 99214 PR OFFICE/OUTPT VISIT, EST, LEVL IV, 30-39 MIN: ICD-10-PCS | Mod: ,,, | Performed by: FAMILY MEDICINE

## 2023-07-18 PROCEDURE — 99214 OFFICE O/P EST MOD 30 MIN: CPT | Mod: ,,, | Performed by: FAMILY MEDICINE

## 2023-07-18 RX ORDER — MAGNESIUM 200 MG
1 TABLET ORAL NIGHTLY
COMMUNITY

## 2023-07-18 RX ORDER — DOXYCYCLINE HYCLATE 100 MG
100 TABLET ORAL 2 TIMES DAILY
Qty: 60 TABLET | Refills: 0 | Status: SHIPPED | OUTPATIENT
Start: 2023-07-18 | End: 2023-08-17

## 2023-07-18 RX ORDER — GLIPIZIDE 5 MG/1
10 TABLET ORAL DAILY
Qty: 180 TABLET | Refills: 1 | Status: SHIPPED | OUTPATIENT
Start: 2023-07-18 | End: 2024-01-14

## 2023-07-18 RX ORDER — SERTRALINE HYDROCHLORIDE 100 MG/1
100 TABLET, FILM COATED ORAL DAILY
Qty: 90 TABLET | Refills: 1 | Status: SHIPPED | OUTPATIENT
Start: 2023-07-18 | End: 2024-01-14

## 2023-07-18 NOTE — PROGRESS NOTES
SUBJECTIVE:  Mariajose Allen is a 89 y.o. female here for Follow-up (labs)      HPI  Patient is here for follow-up on chronic medical conditions.  See assessment and plan for individual list of issues.  Mariajose's allergies, medications, history, and problem list were updated as appropriate.    Review of Systems   She is been more tired lately.  She is developed some pressure sores on her buttocks that are getting worse  Recent Results (from the past 504 hour(s))   TSH    Collection Time: 07/17/23  8:30 AM   Result Value Ref Range    Thyroid Stimulating Hormone 4.430 (H) 0.360 - 3.740 uIU/mL   Vitamin B12    Collection Time: 07/17/23  8:30 AM   Result Value Ref Range    Vitamin B12 Level 249 211 - 946 pg/mL   Comprehensive Metabolic Panel    Collection Time: 07/17/23  8:30 AM   Result Value Ref Range    Sodium Level 136 135 - 145 mmol/L    Potassium Level 5.4 (H) 3.5 - 5.1 mmol/L    Chloride 104 98 - 110 mmol/L    Carbon Dioxide 28 21 - 32 mmol/L    Glucose Level 232 (H) 70 - 115 mg/dL    Blood Urea Nitrogen 33.0 (H) 7.0 - 20.0 mg/dL    Creatinine 1.44 (H) 0.66 - 1.25 mg/dL    Calcium Level Total 8.9 8.4 - 10.2 mg/dL    Protein Total 6.2 (L) 6.3 - 8.2 gm/dL    Albumin Level 3.5 3.4 - 5.0 g/dL    Globulin 2.7 2.0 - 3.9 gm/dL    Albumin/Globulin Ratio 1.3 ratio    Bilirubin Total 0.3 0.0 - 1.0 mg/dL    Alkaline Phosphatase 69 50 - 144 unit/L    Alanine Aminotransferase 15 1 - 45 unit/L    Aspartate Aminotransferase 21 14 - 36 unit/L    eGFR 35 mls/min/1.73/m2    Anion Gap 4.0 2.0 - 13.0 mEq/L    BUN/Creatinine Ratio 23 (H) 12 - 20   Hemoglobin A1C    Collection Time: 07/17/23  8:30 AM   Result Value Ref Range    Hemoglobin A1c 9.2 (H) 4.0 - 6.0 %    Estimated Average Glucose 217.3 (H) 70.0 - 115.0 mg/dL   Lipid Panel    Collection Time: 07/17/23  8:30 AM   Result Value Ref Range    Cholesterol Total 97 0 - 200 mg/dL    HDL Cholesterol 41 40 - 60 mg/dL    Triglyceride 130 30 - 200 mg/dL    LDL Cholesterol Direct 39.9 30.0 -  "100.0 mg/dL   Vitamin D    Collection Time: 07/17/23  8:30 AM   Result Value Ref Range    Vit D 25 OH 22.4 (L) 30.0 - 100.0 ng/mL   CBC with Differential    Collection Time: 07/17/23  8:30 AM   Result Value Ref Range    WBC 8.87 4.00 - 11.50 x10(3)/mcL    RBC 3.79 (L) 4.00 - 5.10 x10(6)/mcL    Hgb 11.2 (L) 11.8 - 16.0 g/dL    Hct 34.0 (L) 36.0 - 48.0 %    MCV 89.7 79.0 - 99.0 fL    MCH 29.6 27.0 - 34.0 pg    MCHC 32.9 31.0 - 37.0 g/dL    RDW 13.5 11.0 - 14.5 %    Platelet 202 140 - 371 x10(3)/mcL    MPV 10.2 9.4 - 12.4 fL    Neut % 52.4 37 - 73 %    Lymph % 36.1 20 - 55 %    Mono % 6.8 4.7 - 12.5 %    Eos % 3.8 0.7 - 7 %    Basophil % 0.6 0.1 - 1.2 %    Lymph # 3.20 1.16 - 3.74 x10(3)/mcL    Neut # 4.65 1.56 - 6.13 x10(3)/mcL    Mono # 0.60 (H) 0.24 - 0.36 x10(3)/mcL    Eos # 0.34 0.04 - 0.36 x10(3)/mcL    Baso # 0.05 0.01 - 0.08 x10(3)/mcL    IG# 0.03 0.0001 - 0.031 x10(3)/mcL    IG% 0.3 0 - 0.5 %    NRBC% 0.0 <=1 %       OBJECTIVE:  Vital signs  Vitals:    07/18/23 1357 07/18/23 1400   BP: (!) 154/60 (!) 128/54   BP Location: Right arm Right arm   Patient Position: Sitting Sitting   Pulse: 94    SpO2: 97%    Weight: 71.2 kg (157 lb)    Height: 5' 4.57" (1.64 m)         Physical Exam heart with a regular rate and rhythm, on the inside of her buttocks she has pressure sores on each side on the inside of the gluteal cleft    ASSESSMENT/PLAN:  1. Hemiparesis as late effect of cerebrovascular accident (CVA)  Blood pressure currently well controlled on statin therapy    2. Hemiplegia of left nondominant side as late effect of cerebral infarction, unspecified hemiplegia type  She is had some increased weakness lately and we will order some therapy for her at home    3. Recurrent major depressive disorder, in partial remission  Seems a little more depressed lately so we will increase her Zoloft back up to 100 mg daily   4. Anxiety  Increase Zoloft  -     sertraline (ZOLOFT) 100 MG tablet; Take 1 tablet (100 mg total) by " mouth once daily.  Dispense: 90 tablet; Refill: 1    5. Mixed hyperlipidemia  Continue statin therapy  Overview:  Lab Results   Component Value Date    CHOL 127 06/03/2021    HDL 43 06/03/2021    TRIG 107 06/03/2021         6. Primary hypertension  Blood pressure well controlled    7. Type 2 diabetes mellitus without complication, without long-term current use of insulin  A1c is up quite a bit from the last 3 months.  I wonder if this maybe from a little infection from her sacral wounds.  We will putting antibiotics but I would also like to increase her glipizide to 10 mg daily  Overview:  Lab Results   Component Value Date    HGBA1C 9.2 (H) 07/17/2023    HGBA1C 9.8 (H) 06/21/2023    HGBA1C 7.6 (H) 03/14/2023          8. Acquired hypothyroidism  Continue levothyroxine 5 mcg daily    9. Wound of sacral region, initial encounter  Doxycycline 100 mg twice a day for 1 month and place there is some infection here.  I talked to home health about trying to get some Allevyn dressings           Orders:  -     glipiZIDE (GLUCOTROL) 5 MG tablet; Take 2 tablets (10 mg total) by mouth once daily.  Dispense: 180 tablet; Refill: 1    Other orders  -     doxycycline (VIBRA-TABS) 100 MG tablet; Take 1 tablet (100 mg total) by mouth 2 (two) times daily.  Dispense: 60 tablet; Refill: 0         Follow Up:  Follow up in about 1 month (around 8/18/2023).

## 2023-08-08 DIAGNOSIS — E78.5 HYPERLIPIDEMIA, UNSPECIFIED HYPERLIPIDEMIA TYPE: ICD-10-CM

## 2023-08-08 RX ORDER — ATORVASTATIN CALCIUM 10 MG/1
TABLET, FILM COATED ORAL
Qty: 90 TABLET | Refills: 0 | Status: SHIPPED | OUTPATIENT
Start: 2023-08-08

## 2023-08-18 DIAGNOSIS — F33.9 EPISODE OF RECURRENT MAJOR DEPRESSIVE DISORDER, UNSPECIFIED DEPRESSION EPISODE SEVERITY: ICD-10-CM

## 2023-08-21 ENCOUNTER — OFFICE VISIT (OUTPATIENT)
Dept: FAMILY MEDICINE | Facility: CLINIC | Age: 88
End: 2023-08-21
Payer: MEDICARE

## 2023-08-21 ENCOUNTER — HOSPITAL ENCOUNTER (INPATIENT)
Facility: HOSPITAL | Age: 88
LOS: 3 days | Discharge: HOSPICE | DRG: 683 | End: 2023-08-24
Attending: FAMILY MEDICINE | Admitting: FAMILY MEDICINE
Payer: MEDICARE

## 2023-08-21 VITALS
HEART RATE: 93 BPM | WEIGHT: 159 LBS | DIASTOLIC BLOOD PRESSURE: 62 MMHG | OXYGEN SATURATION: 96 % | SYSTOLIC BLOOD PRESSURE: 138 MMHG | TEMPERATURE: 97 F | BODY MASS INDEX: 26.49 KG/M2 | HEIGHT: 65 IN

## 2023-08-21 DIAGNOSIS — R05.9 COUGH IN ADULT: ICD-10-CM

## 2023-08-21 DIAGNOSIS — R53.1 WEAKNESS: ICD-10-CM

## 2023-08-21 DIAGNOSIS — R10.13 EPIGASTRIC PAIN: Primary | ICD-10-CM

## 2023-08-21 DIAGNOSIS — N30.00 ACUTE CYSTITIS WITHOUT HEMATURIA: ICD-10-CM

## 2023-08-21 DIAGNOSIS — N17.9 AKI (ACUTE KIDNEY INJURY): ICD-10-CM

## 2023-08-21 DIAGNOSIS — I69.359 HEMIPARESIS AS LATE EFFECT OF CEREBROVASCULAR ACCIDENT (CVA): ICD-10-CM

## 2023-08-21 DIAGNOSIS — I10 PRIMARY HYPERTENSION: ICD-10-CM

## 2023-08-21 DIAGNOSIS — S31.000A WOUND OF SACRAL REGION, INITIAL ENCOUNTER: ICD-10-CM

## 2023-08-21 DIAGNOSIS — N18.32 STAGE 3B CHRONIC KIDNEY DISEASE: ICD-10-CM

## 2023-08-21 DIAGNOSIS — M62.82 NON-TRAUMATIC RHABDOMYOLYSIS: Primary | ICD-10-CM

## 2023-08-21 DIAGNOSIS — E11.9 TYPE 2 DIABETES MELLITUS WITHOUT COMPLICATION, WITHOUT LONG-TERM CURRENT USE OF INSULIN: ICD-10-CM

## 2023-08-21 LAB
ALBUMIN SERPL-MCNC: 3.9 G/DL (ref 3.4–5)
ALBUMIN/GLOB SERPL: 1.2 RATIO
ALP SERPL-CCNC: 74 UNIT/L (ref 50–144)
ALT SERPL-CCNC: 34 UNIT/L (ref 1–45)
ANION GAP SERPL CALC-SCNC: 9 MEQ/L (ref 2–13)
APPEARANCE UR: CLEAR
AST SERPL-CCNC: 97 UNIT/L (ref 14–36)
BACTERIA #/AREA URNS AUTO: ABNORMAL /HPF
BASOPHILS # BLD AUTO: 0.05 X10(3)/MCL (ref 0.01–0.08)
BASOPHILS NFR BLD AUTO: 0.5 % (ref 0.1–1.2)
BILIRUB SERPL-MCNC: 0.5 MG/DL (ref 0–1)
BILIRUB UR QL STRIP.AUTO: ABNORMAL
BUN SERPL-MCNC: 44 MG/DL (ref 7–20)
CALCIUM SERPL-MCNC: 9.4 MG/DL (ref 8.4–10.2)
CHLORIDE SERPL-SCNC: 103 MMOL/L (ref 98–110)
CK SERPL-CCNC: 1557 U/L (ref 30–135)
CO2 SERPL-SCNC: 25 MMOL/L (ref 21–32)
COLOR UR: YELLOW
CREAT SERPL-MCNC: 2.06 MG/DL (ref 0.66–1.25)
CREAT/UREA NIT SERPL: 21 (ref 12–20)
EOSINOPHIL # BLD AUTO: 0.06 X10(3)/MCL (ref 0.04–0.36)
EOSINOPHIL NFR BLD AUTO: 0.6 % (ref 0.7–7)
ERYTHROCYTE [DISTWIDTH] IN BLOOD BY AUTOMATED COUNT: 13.7 % (ref 11–14.5)
GFR SERPLBLD CREATININE-BSD FMLA CKD-EPI: 23 MLS/MIN/1.73/M2
GLOBULIN SER-MCNC: 3.3 GM/DL (ref 2–3.9)
GLUCOSE SERPL-MCNC: 192 MG/DL (ref 70–115)
GLUCOSE UR QL STRIP.AUTO: NEGATIVE
HCT VFR BLD AUTO: 39.6 % (ref 36–48)
HGB BLD-MCNC: 12.7 G/DL (ref 11.8–16)
IMM GRANULOCYTES # BLD AUTO: 0.06 X10(3)/MCL (ref 0–0.03)
IMM GRANULOCYTES NFR BLD AUTO: 0.6 % (ref 0–0.5)
KETONES UR QL STRIP.AUTO: NEGATIVE
LEUKOCYTE ESTERASE UR QL STRIP.AUTO: ABNORMAL
LIPASE SERPL-CCNC: 41 U/L (ref 23–300)
LYMPHOCYTES # BLD AUTO: 1.18 X10(3)/MCL (ref 1.16–3.74)
LYMPHOCYTES NFR BLD AUTO: 12.3 % (ref 20–55)
MCH RBC QN AUTO: 29.2 PG (ref 27–34)
MCHC RBC AUTO-ENTMCNC: 32.1 G/DL (ref 31–37)
MCV RBC AUTO: 91 FL (ref 79–99)
MONOCYTES # BLD AUTO: 0.52 X10(3)/MCL (ref 0.24–0.36)
MONOCYTES NFR BLD AUTO: 5.4 % (ref 4.7–12.5)
NEUTROPHILS # BLD AUTO: 7.69 X10(3)/MCL (ref 1.56–6.13)
NEUTROPHILS NFR BLD AUTO: 80.6 % (ref 37–73)
NITRITE UR QL STRIP.AUTO: POSITIVE
NRBC BLD AUTO-RTO: 0 %
PH UR STRIP.AUTO: 6 [PH]
PLATELET # BLD AUTO: 196 X10(3)/MCL (ref 140–371)
PMV BLD AUTO: 10.5 FL (ref 9.4–12.4)
POTASSIUM SERPL-SCNC: 5.1 MMOL/L (ref 3.5–5.1)
PROT SERPL-MCNC: 7.2 GM/DL (ref 6.3–8.2)
PROT UR QL STRIP.AUTO: ABNORMAL
RBC # BLD AUTO: 4.35 X10(6)/MCL (ref 4–5.1)
RBC #/AREA URNS AUTO: ABNORMAL /HPF
RBC UR QL AUTO: ABNORMAL
SODIUM SERPL-SCNC: 137 MMOL/L (ref 135–145)
SP GR UR STRIP.AUTO: 1.02
SQUAMOUS #/AREA URNS AUTO: ABNORMAL /HPF
TROPONIN I SERPL-MCNC: 0.03 NG/ML (ref 0–0.03)
UROBILINOGEN UR STRIP-ACNC: 0.2
WBC # SPEC AUTO: 9.56 X10(3)/MCL (ref 4–11.5)
WBC #/AREA URNS AUTO: ABNORMAL /HPF

## 2023-08-21 PROCEDURE — 25000003 PHARM REV CODE 250: Performed by: FAMILY MEDICINE

## 2023-08-21 PROCEDURE — P9612 CATHETERIZE FOR URINE SPEC: HCPCS

## 2023-08-21 PROCEDURE — 82550 ASSAY OF CK (CPK): CPT | Performed by: FAMILY MEDICINE

## 2023-08-21 PROCEDURE — 99214 OFFICE O/P EST MOD 30 MIN: CPT | Mod: ,,, | Performed by: FAMILY MEDICINE

## 2023-08-21 PROCEDURE — 93010 EKG 12-LEAD: ICD-10-PCS | Mod: ,,, | Performed by: INTERNAL MEDICINE

## 2023-08-21 PROCEDURE — 83690 ASSAY OF LIPASE: CPT | Performed by: FAMILY MEDICINE

## 2023-08-21 PROCEDURE — 80053 COMPREHEN METABOLIC PANEL: CPT | Performed by: FAMILY MEDICINE

## 2023-08-21 PROCEDURE — 85025 COMPLETE CBC W/AUTO DIFF WBC: CPT | Performed by: FAMILY MEDICINE

## 2023-08-21 PROCEDURE — 93010 ELECTROCARDIOGRAM REPORT: CPT | Mod: ,,, | Performed by: INTERNAL MEDICINE

## 2023-08-21 PROCEDURE — 63600175 PHARM REV CODE 636 W HCPCS: Performed by: FAMILY MEDICINE

## 2023-08-21 PROCEDURE — 81001 URINALYSIS AUTO W/SCOPE: CPT | Performed by: FAMILY MEDICINE

## 2023-08-21 PROCEDURE — 96374 THER/PROPH/DIAG INJ IV PUSH: CPT

## 2023-08-21 PROCEDURE — 11000001 HC ACUTE MED/SURG PRIVATE ROOM

## 2023-08-21 PROCEDURE — 36415 COLL VENOUS BLD VENIPUNCTURE: CPT | Performed by: FAMILY MEDICINE

## 2023-08-21 PROCEDURE — 99285 EMERGENCY DEPT VISIT HI MDM: CPT | Mod: 25

## 2023-08-21 PROCEDURE — 99214 PR OFFICE/OUTPT VISIT, EST, LEVL IV, 30-39 MIN: ICD-10-PCS | Mod: ,,, | Performed by: FAMILY MEDICINE

## 2023-08-21 PROCEDURE — 87088 URINE BACTERIA CULTURE: CPT | Performed by: FAMILY MEDICINE

## 2023-08-21 PROCEDURE — 93005 ELECTROCARDIOGRAM TRACING: CPT

## 2023-08-21 PROCEDURE — 87186 SC STD MICRODIL/AGAR DIL: CPT | Performed by: FAMILY MEDICINE

## 2023-08-21 PROCEDURE — 84484 ASSAY OF TROPONIN QUANT: CPT | Performed by: FAMILY MEDICINE

## 2023-08-21 RX ORDER — MIRTAZAPINE 15 MG/1
TABLET, FILM COATED ORAL
Qty: 90 TABLET | Refills: 0 | Status: SHIPPED | OUTPATIENT
Start: 2023-08-21

## 2023-08-21 RX ORDER — ONDANSETRON 2 MG/ML
8 INJECTION INTRAMUSCULAR; INTRAVENOUS
Status: COMPLETED | OUTPATIENT
Start: 2023-08-21 | End: 2023-08-21

## 2023-08-21 RX ORDER — LANOLIN ALCOHOL/MO/W.PET/CERES
400 CREAM (GRAM) TOPICAL DAILY
Status: DISCONTINUED | OUTPATIENT
Start: 2023-08-22 | End: 2023-08-24 | Stop reason: HOSPADM

## 2023-08-21 RX ORDER — SODIUM CHLORIDE 0.9 % (FLUSH) 0.9 %
3 SYRINGE (ML) INJECTION EVERY 6 HOURS PRN
Status: DISCONTINUED | OUTPATIENT
Start: 2023-08-21 | End: 2023-08-24 | Stop reason: HOSPADM

## 2023-08-21 RX ORDER — TRAZODONE HYDROCHLORIDE 50 MG/1
50 TABLET ORAL NIGHTLY
Status: DISCONTINUED | OUTPATIENT
Start: 2023-08-21 | End: 2023-08-24 | Stop reason: HOSPADM

## 2023-08-21 RX ORDER — SODIUM CHLORIDE 9 MG/ML
INJECTION, SOLUTION INTRAVENOUS CONTINUOUS
Status: DISCONTINUED | OUTPATIENT
Start: 2023-08-21 | End: 2023-08-24 | Stop reason: HOSPADM

## 2023-08-21 RX ORDER — LEVOTHYROXINE SODIUM 75 UG/1
75 TABLET ORAL EVERY MORNING
Status: DISCONTINUED | OUTPATIENT
Start: 2023-08-22 | End: 2023-08-22

## 2023-08-21 RX ORDER — PANTOPRAZOLE SODIUM 40 MG/1
40 TABLET, DELAYED RELEASE ORAL DAILY
Status: DISCONTINUED | OUTPATIENT
Start: 2023-08-22 | End: 2023-08-24 | Stop reason: HOSPADM

## 2023-08-21 RX ORDER — ONDANSETRON 2 MG/ML
4 INJECTION INTRAMUSCULAR; INTRAVENOUS EVERY 6 HOURS PRN
Status: DISCONTINUED | OUTPATIENT
Start: 2023-08-21 | End: 2023-08-24 | Stop reason: HOSPADM

## 2023-08-21 RX ORDER — CLOPIDOGREL BISULFATE 75 MG/1
75 TABLET ORAL DAILY
Status: DISCONTINUED | OUTPATIENT
Start: 2023-08-22 | End: 2023-08-24 | Stop reason: HOSPADM

## 2023-08-21 RX ORDER — AMLODIPINE BESYLATE 5 MG/1
5 TABLET ORAL DAILY
Status: DISCONTINUED | OUTPATIENT
Start: 2023-08-22 | End: 2023-08-24 | Stop reason: HOSPADM

## 2023-08-21 RX ORDER — ENOXAPARIN SODIUM 100 MG/ML
30 INJECTION SUBCUTANEOUS EVERY 24 HOURS
Status: DISCONTINUED | OUTPATIENT
Start: 2023-08-21 | End: 2023-08-23

## 2023-08-21 RX ORDER — LISINOPRIL 10 MG/1
20 TABLET ORAL DAILY
Status: DISCONTINUED | OUTPATIENT
Start: 2023-08-22 | End: 2023-08-24 | Stop reason: HOSPADM

## 2023-08-21 RX ORDER — SERTRALINE HYDROCHLORIDE 50 MG/1
100 TABLET, FILM COATED ORAL DAILY
Status: DISCONTINUED | OUTPATIENT
Start: 2023-08-22 | End: 2023-08-24 | Stop reason: HOSPADM

## 2023-08-21 RX ADMIN — ONDANSETRON 4 MG: 2 INJECTION INTRAMUSCULAR; INTRAVENOUS at 06:08

## 2023-08-21 RX ADMIN — TRAZODONE HYDROCHLORIDE 50 MG: 50 TABLET ORAL at 09:08

## 2023-08-21 RX ADMIN — CEFTRIAXONE SODIUM 1 G: 1 INJECTION, POWDER, FOR SOLUTION INTRAMUSCULAR; INTRAVENOUS at 06:08

## 2023-08-21 RX ADMIN — ENOXAPARIN SODIUM 30 MG: 30 INJECTION SUBCUTANEOUS at 06:08

## 2023-08-21 RX ADMIN — SODIUM CHLORIDE 1000 ML: 9 INJECTION, SOLUTION INTRAVENOUS at 04:08

## 2023-08-21 RX ADMIN — SODIUM CHLORIDE: 9 INJECTION, SOLUTION INTRAVENOUS at 06:08

## 2023-08-21 RX ADMIN — ONDANSETRON 8 MG: 2 INJECTION INTRAMUSCULAR; INTRAVENOUS at 03:08

## 2023-08-21 NOTE — PROGRESS NOTES
"SUBJECTIVE:  Mariajose Allen is a 89 y.o. female here for Follow-up (Chronic conditions)      HPI  Patient is here for follow-up.  She has been feeling really bad the last few days.  She has been having some dark stools and epigastric pain with nausea.  She has a history of small-bowel obstruction in the past.  She has not wanted to eat several days.  Family is worried about her sacral wound as well.  They been having trouble taking care of the nose has been very bruised lately.  She is getting weaker and not able to assist with transfers.  Family is looking to get a higher level of care a trouble taking care of her at home at this point.  Mariajose's allergies, medications, history, and problem list were updated as appropriate.    Review of Systems   See HPI.    No results found for this or any previous visit (from the past 504 hour(s)).    OBJECTIVE:  Vital signs  Vitals:    08/21/23 1352   BP: 138/62   BP Location: Right arm   Patient Position: Sitting   Pulse: 93   Temp: 97 °F (36.1 °C)   TempSrc: Temporal   SpO2: 96%   Weight: 72.1 kg (159 lb)   Height: 5' 4.57" (1.64 m)        Physical Exam patient looks like she does not feel good today.  She is belching and holding an emesis basin.  Her abdomen is tender in the epigastric area.  We were able to stand up with assistance of look at her sacral area.  She has some small skin breakdown but a lot of bruising    ASSESSMENT/PLAN:  1. Epigastric pain  Worried about a small bowel obstruction or upper GI bleed.  However go to the emergency room for possible admission    2. Stage 3b chronic kidney disease  Stable    3. Type 2 diabetes mellitus without complication, without long-term current use of insulin  Stable on glipizide  Overview:  Lab Results   Component Value Date    HGBA1C 9.2 (H) 07/17/2023    HGBA1C 9.8 (H) 06/21/2023    HGBA1C 7.6 (H) 03/14/2023          4. Primary hypertension  Blood pressure well controlled    5. Hemiparesis as late effect of cerebrovascular " accident (CVA)      6. Wound of sacral region, initial encounter  She needs some offloading and wound care         Follow Up:  No follow-ups on file.  Maker follow-up after she is out of the hospital

## 2023-08-21 NOTE — ED PROVIDER NOTES
Encounter Date: 8/21/2023       History     Chief Complaint   Patient presents with    Abdominal Pain    Nausea    generalized weakness     Pt.  Brought in per Family from PCP. Reports generalized weakness over pass week, with one episode of dark tarry stool today. Lost of appetite, Nausea no vomiting. Denies fever. Reports to be on Plavix.      Patient presents with son.  The son reports that the patient has had minimal p.o. intake to include because for the past 6 days and nausea without vomiting.  That the son reports no fevers chills sweats or diarrhea.  Patient has history of previous stroke in the past and is on Plavix.  The patient denies pain but examination of the abdomen shows her to be diffusely mildly tender.  They report no cough shortness of breath or sputum.    The history is provided by a relative.     Review of patient's allergies indicates:   Allergen Reactions    Clindamycin      Other reaction(s): Unknown    Iodinated contrast media      Other reaction(s): Unknown    Povidone-iodine      No past medical history on file.  No past surgical history on file.  No family history on file.  Social History     Tobacco Use    Smoking status: Never    Smokeless tobacco: Never     Review of Systems   Constitutional:  Positive for appetite change. Negative for fever.   HENT: Negative.     Respiratory: Negative.     Cardiovascular: Negative.    Gastrointestinal:  Positive for abdominal pain and nausea. Negative for diarrhea and vomiting.   Neurological:  Positive for weakness.       Physical Exam     Initial Vitals [08/21/23 1431]   BP Pulse Resp Temp SpO2   (!) 124/54 89 18 98.6 °F (37 °C) 96 %      MAP       --         Physical Exam    Constitutional: She appears well-developed and well-nourished.   HENT:   Head: Normocephalic and atraumatic.   Eyes: EOM are normal. Pupils are equal, round, and reactive to light.   Cardiovascular:  Normal rate, regular rhythm and normal heart sounds.            Pulmonary/Chest: Breath sounds normal.   Abdominal: Abdomen is soft. Bowel sounds are normal.   Diffusely tender all quads, no rigidity   Musculoskeletal:         General: Normal range of motion.     Neurological: She is alert.   Skin: Skin is warm and dry.         ED Course   Procedures  Labs Reviewed   CK - Abnormal; Notable for the following components:       Result Value    Creatine Kinase 1,557 (*)     All other components within normal limits   COMPREHENSIVE METABOLIC PANEL - Abnormal; Notable for the following components:    Glucose Level 192 (*)     Blood Urea Nitrogen 44.0 (*)     Creatinine 2.06 (*)     Aspartate Aminotransferase 97 (*)     BUN/Creatinine Ratio 21 (*)     All other components within normal limits   CBC WITH DIFFERENTIAL - Abnormal; Notable for the following components:    Neut % 80.6 (*)     Lymph % 12.3 (*)     Eos % 0.6 (*)     Neut # 7.69 (*)     Mono # 0.52 (*)     IG# 0.06 (*)     IG% 0.6 (*)     All other components within normal limits   TROPONIN I - Normal   LIPASE - Normal   CBC W/ AUTO DIFFERENTIAL    Narrative:     The following orders were created for panel order CBC auto differential.  Procedure                               Abnormality         Status                     ---------                               -----------         ------                     CBC with Differential[[id number]]        Abnormal            Final result                 Please view results for these tests on the individual orders.   URINALYSIS        ECG Results              EKG 12-lead (Preliminary result)  Result time 08/21/23 15:23:34      Wet Read by Yordan Child MD (08/21/23 15:23:34, [facility], Emergency Medicine)    Nsr, rate 67, nl pr interval, nl st, prolonged qrs                                    Imaging Results              X-Ray Chest AP Portable (Final result)  Result time 08/21/23 15:51:24      Final result by Gonzalez Babin III, MD (08/21/23 15:51:24)                    Impression:      1. I see no lobar or segmental infiltrates or other significant abnormalities.See above comments.      Electronically signed by: Gonzalez Babin  Date:    08/21/2023  Time:    15:51               Narrative:    EXAMINATION:  STUDY: XR CHEST AP PORTABLE    CLINICAL HISTORY AND TECHNIQUE:  Arvind Dailey, RT on 8/21/2023  3:43 PM    CLINICAL HX: ER PT    X 1 WEEK    C/O GENERALZIED WEAKNESS, LOSS OF APPETITE, NAUSEA    PAST MEDICAL HX: CV (+)    TECHNIQUE: 1V PORTABLE CHEST    TECH: AKG/DB/NN    PT TRANSPORTED WO INCIDENT    COMPARISON:  None    FINDINGS:  The lungs are slightly under expanded.The cardiac, hilar, and mediastinal contours appear unremarkable.I see no lobar or segmental infiltrates.No significant pleural effusions are noted.There is moderate demineralization of the skeletal structures with moderate degenerative changes noted throughout the thoracic spine.  Atherosclerotic calcifications are noted within the aortic arch.                                       CT Abdomen Pelvis  Without Contrast (Final result)  Result time 08/21/23 15:38:50      Final result by Gonzalez Babin III, MD (08/21/23 15:38:50)                   Impression:      1. Chronic changes are present as described above and as seen previously.  See above comments.      Electronically signed by: Gonzalez Babin  Date:    08/21/2023  Time:    15:38               Narrative:    EXAMINATION:  CT ABDOMEN PELVIS WITHOUT CONTRAST    CLINICAL HISTORY AND TECHNIQUE:  Anamaria Lawson, RT on 8/21/2023  3:33 PM    PT STATUS: ER    PROCEDURE: CT ABD/PELVIS WO    CLINICAL HX : X'S 1 WEEK, WEAKNESS, BLOOD IN STOOL & NAUSEA    PMH: CVA    IV CONTRAST: NONE    ORAL CONTRAST: NONE    RECTAL CONTRAST: NONE    AXIAL IMAGES @ 5MM INTERVALS WITH MULTIPLANAR RECONSTRUCTION    TOTAL IMAGE NUMBER: 153    NUMBER OF CT SCANS IN PAST 12 MONTHS: 1    CTDIvol(mGy): HEAD:     BODY: 6.70    DLP(mGycm): HEAD:     BODY:  364.20    TECH: AKG/DB    PT  TRANSPORTED W/O INCIDENT    This patient has had 1 CT and nuclear medicine scans performed within the last 12 months.    The following DOSE REDUCTION TECHNIQUES are used for all CT scans at [facility]:    1. Automated exposure control.  2. Adjustment of the mA and/or kv according to patient size.  3. Use of iterative reconstruction technique.    COMPARISON:  08/28/2020    FINDINGS:  Liver: No clinically significant abnormalities are noted.    Gallbladder/biliary system: Previous cholecystectomy.    Spleen: Vascular calcifications are noted within the splenic artery and splenic hilum.  Coarse parenchymal calcifications are noted within the splenic parenchyma as can be seen with old granulomatous disease.    Adrenal glands: No clinically significant abnormalities are noted.    Pancreas: The pancreas is moderately atrophic with no worrisome inflammatory changes or focal masses appreciated.    Kidneys/ureters: There is moderate atrophy of both kidneys with no worrisome focal parenchymal abnormalities appreciated.  I see no obvious ureteral stones or changes to suggest ureteral obstruction.    Urinary bladder: The urinary bladder is poorly distended and difficult to evaluate with no definite abnormalities noted.    Uterus and ovaries: The patient is post hysterectomy.    GI tract: Unopacified loops of large and small bowel as well as the gastric lumen and appendix are difficult to evaluate with no worrisome abnormalities noted.    Vascular structures: Moderate atherosclerotic plaquing is noted throughout the abdominal aorta is primary branches.    Musculoskeletal structures: There is significant demineralization of the skeletal structures with a moderate, levoconvex, scoliotic curvature of the lumbar spine and fairly prominent degenerative changes noted involving the lower thoracic and lumbar spine.    Miscellaneous: Electronic stimulator is noted within the subcutaneous fat of the right flank with  the distal tip of the lead located within the region of the left sacral wing.                                       Medications   sodium chloride 0.9% bolus 1,000 mL 1,000 mL (has no administration in time range)   ondansetron injection 8 mg (8 mg Intravenous Given 8/21/23 1525)     Medical Decision Making  Amount and/or Complexity of Data Reviewed  Labs: ordered.  Radiology: ordered.    Risk  Prescription drug management.  Decision regarding hospitalization.                               Clinical Impression:   Final diagnoses:  [R53.1] Weakness  [R05.9] Cough in adult  [M62.82] Non-traumatic rhabdomyolysis (Primary)  [N17.9] MARIE (acute kidney injury)        ED Disposition Condition    Admit Stable                Yordan Child MD  08/21/23 0557

## 2023-08-21 NOTE — H&P
Tele-Hospitalist History and Physical  Telemedicine Service was provided using HIPPA compliant web platform using SOC for audio/visual equipment.   Patient Location: [place]   Provider Location: [place], [place]  Participants on call: bedside RN, patient  Physician on call: Dr. Trevizo  Patient aware of remote access and use of Telemedicine and agrees to continue with care.        History & Physical  Department of Hospital Medicine      SUBJECTIVE:     CC/Reason for Admission to Hospital:   Chief Complaint   Patient presents with    Abdominal Pain    Nausea    generalized weakness     Pt.  Brought in per Family from PCP. Reports generalized weakness over pass week, with one episode of dark tarry stool today. Lost of appetite, Nausea no vomiting. Denies fever. Reports to be on Plavix.        History of Present Illness: History obtained from Patient and son at bedside    Pt is a 89 y.o. female with hx of HTN, hx of CVA, Hypothyroidism, GERD, possible hx of Dementia who presented with nausea and decreased appetite.  Hard to quantify how long, patient says nausea has been present for a while but did not narrow down a time frame.  She denies vomiting just the nausea.  No fevers or chills.  Her son reports a loose/soft BM this am that the patient described a dark with a possible trace of blood.  She denies CP or SOB.  No recent sick contacts.  She denies abdominal pain when asked, however on exam seems to have some tenderness in lower and left lower abdomen.  No report of any urinary symptoms.     Review of patient's allergies indicates:   Allergen Reactions    Clindamycin      Other reaction(s): Unknown    Iodinated contrast media      Other reaction(s): Unknown    Povidone-iodine         HTN, Hypothyroidism, Dementia, Hx of CVA  No past surgical history on file. She reports an Exp lap years ago  No family history on file.  Social History     Tobacco Use    Smoking status: Never    Smokeless tobacco: Never          No current facility-administered medications on file prior to encounter.     Current Outpatient Medications on File Prior to Encounter   Medication Sig Dispense Refill    amLODIPine (NORVASC) 5 MG tablet Take 1 tablet (5 mg total) by mouth once daily. 90 tablet 1    atorvastatin (LIPITOR) 10 MG tablet TAKE ONE TABLET BY MOUTH EVERY EVENING FOR high cholesterol 90 tablet 0    clopidogreL (PLAVIX) 75 mg tablet Take 1 tablet by mouth once daily.      glipiZIDE (GLUCOTROL) 5 MG tablet Take 2 tablets (10 mg total) by mouth once daily. 180 tablet 1    hydroCHLOROthiazide (HYDRODIURIL) 25 MG tablet TAKE ONE TABLET BY MOUTH EVERY DAY 90 tablet 1    levothyroxine (SYNTHROID) 75 MCG tablet TAKE ONE TABLET BY MOUTH EVERY MORNING 90 tablet 1    lisinopriL (PRINIVIL,ZESTRIL) 20 MG tablet TAKE ONE TABLET BY MOUTH ONCE daily 90 tablet 1    magnesium 200 mg Tab Take 1 tablet by mouth every evening.      mirtazapine (REMERON) 15 MG tablet TAKE ONE TABLET BY MOUTH EVERY EVENING FOR sleep/depression 90 tablet 0    pantoprazole (PROTONIX) 40 MG tablet Take 1 tablet (40 mg total) by mouth once daily. 90 tablet 3    psyllium seed (FIBER THERAPY, PSYLLIUM, ORAL) Take 0.5 tablets by mouth once daily.      sertraline (ZOLOFT) 100 MG tablet Take 1 tablet (100 mg total) by mouth once daily. 90 tablet 1    spironolactone (ALDACTONE) 25 MG tablet TAKE ONE TABLET BY MOUTH ONCE daily 90 tablet 1    traZODone (DESYREL) 50 MG tablet Take 1 tablet (50 mg total) by mouth every evening. 90 tablet 1    [DISCONTINUED] mirtazapine (REMERON) 15 MG tablet TAKE ONE TABLET BY MOUTH EVERY EVENING FOR sleep/depression 90 tablet 0       Review of Systems:  Constitutional: No fever, chills, weight loss  ENT: No nasal congestion or sore throat  Respiratory: No cough or shortness of breath  Cardiovascular: No chest pain or palpitations  Gastrointestinal: see HPI  Genitourinary: No hematuria or dysuria, negative for frequency  Musculoskeletal: No  arthralgias or myalgias  Neurological: No seizures or tremors, negative for dizziness and headaches  Psychiatric: No inappropriate thought content. No inappropriate behavior.      OBJECTIVE:     Vital Signs (Most Recent):  Temp: 98.6 °F (37 °C) (08/21/23 1431)  Pulse: 65 (08/21/23 1558)  Resp: 20 (08/21/23 1558)  BP: 127/64 (08/21/23 1558)  SpO2: 100 % (08/21/23 1558)       Physical Exam:  General - Resting comfortably in bed. No apparent distress.  HEENT - PERRLA. Extraocular movements intact. No scleral icterus.   Neck -  The JVP is not elevated.   CV - Regular rate and rhythm, No Murmur, rubs, or gallops.  Resp - Good inspiratory effort. The lungs are clear to auscultation no audible wheeze  GI - Soft. Some TTP in lower and left lower abdomen.   Extrem -  No cyanosis, clubbing, edema.  Neuro - Grossly intact, weak, able to respond to questions and follow commands   PSYC - Alert and oriented some mild confusion but likely baseline. Normal affect   SKIN - No obvious rash    Data Review:  CBC:   Lab Results   Component Value Date    WBC 9.56 08/21/2023    RBC 4.35 08/21/2023    HGB 12.7 08/21/2023    HCT 39.6 08/21/2023     08/21/2023     BMP:   Lab Results   Component Value Date     08/21/2023    K 5.1 08/21/2023    CO2 25 08/21/2023    BUN 44.0 (H) 08/21/2023    CREATININE 2.06 (H) 08/21/2023    CALCIUM 9.4 08/21/2023         Imaging Results              X-Ray Chest AP Portable (Final result)  Result time 08/21/23 15:51:24      Final result by Gonzalez Babin III, MD (08/21/23 15:51:24)                   Impression:      1. I see no lobar or segmental infiltrates or other significant abnormalities.See above comments.      Electronically signed by: Gonzalez Babin  Date:    08/21/2023  Time:    15:51               Narrative:    EXAMINATION:  STUDY: XR CHEST AP PORTABLE    CLINICAL HISTORY AND TECHNIQUE:  Arvind Dailey RT on 8/21/2023  3:43 PM    CLINICAL HX: ER PT    X 1 WEEK    C/O GENERALZIED WEAKNESS,  LOSS OF APPETITE, NAUSEA    PAST MEDICAL HX: CV (+)    TECHNIQUE: 1V PORTABLE CHEST    TECH: AKG/DB/NN    PT TRANSPORTED WO INCIDENT    COMPARISON:  None    FINDINGS:  The lungs are slightly under expanded.The cardiac, hilar, and mediastinal contours appear unremarkable.I see no lobar or segmental infiltrates.No significant pleural effusions are noted.There is moderate demineralization of the skeletal structures with moderate degenerative changes noted throughout the thoracic spine.  Atherosclerotic calcifications are noted within the aortic arch.                                       CT Abdomen Pelvis  Without Contrast (Final result)  Result time 08/21/23 15:38:50      Final result by Gonzalez Babin III, MD (08/21/23 15:38:50)                   Impression:      1. Chronic changes are present as described above and as seen previously.  See above comments.      Electronically signed by: Gonzalez Babin  Date:    08/21/2023  Time:    15:38               Narrative:    EXAMINATION:  CT ABDOMEN PELVIS WITHOUT CONTRAST    CLINICAL HISTORY AND TECHNIQUE:  Anamaria Lawson, RT on 8/21/2023  3:33 PM    PT STATUS: ER    PROCEDURE: CT ABD/PELVIS WO    CLINICAL HX : X'S 1 WEEK, WEAKNESS, BLOOD IN STOOL & NAUSEA    PMH: CVA    IV CONTRAST: NONE    ORAL CONTRAST: NONE    RECTAL CONTRAST: NONE    AXIAL IMAGES @ 5MM INTERVALS WITH MULTIPLANAR RECONSTRUCTION    TOTAL IMAGE NUMBER: 153    NUMBER OF CT SCANS IN PAST 12 MONTHS: 1    CTDIvol(mGy): HEAD:     BODY: 6.70    DLP(mGycm): HEAD:     BODY:  364.20    TECH: AKG/DB    PT TRANSPORTED W/O INCIDENT    This patient has had 1 CT and nuclear medicine scans performed within the last 12 months.    The following DOSE REDUCTION TECHNIQUES are used for all CT scans at [facility]:    1. Automated exposure control.  2. Adjustment of the mA and/or kv according to patient size.  3. Use of iterative reconstruction technique.    COMPARISON:  08/28/2020    FINDINGS:  Liver: No  clinically significant abnormalities are noted.    Gallbladder/biliary system: Previous cholecystectomy.    Spleen: Vascular calcifications are noted within the splenic artery and splenic hilum.  Coarse parenchymal calcifications are noted within the splenic parenchyma as can be seen with old granulomatous disease.    Adrenal glands: No clinically significant abnormalities are noted.    Pancreas: The pancreas is moderately atrophic with no worrisome inflammatory changes or focal masses appreciated.    Kidneys/ureters: There is moderate atrophy of both kidneys with no worrisome focal parenchymal abnormalities appreciated.  I see no obvious ureteral stones or changes to suggest ureteral obstruction.    Urinary bladder: The urinary bladder is poorly distended and difficult to evaluate with no definite abnormalities noted.    Uterus and ovaries: The patient is post hysterectomy.    GI tract: Unopacified loops of large and small bowel as well as the gastric lumen and appendix are difficult to evaluate with no worrisome abnormalities noted.    Vascular structures: Moderate atherosclerotic plaquing is noted throughout the abdominal aorta is primary branches.    Musculoskeletal structures: There is significant demineralization of the skeletal structures with a moderate, levoconvex, scoliotic curvature of the lumbar spine and fairly prominent degenerative changes noted involving the lower thoracic and lumbar spine.    Miscellaneous: Electronic stimulator is noted within the subcutaneous fat of the right flank with the distal tip of the lead located within the region of the left sacral wing.                                        Acute medical issues and MDM for this admission:       Problem: Non-traumatic Rhabdomyolysis  Differential Dx: NA  Chronic issues affecting care: age, intake   Radiology reports reviewed and/or personal interpretation: report reviewed as above   Tests considered or ordered: labs  Plan of care: IVFs  and monitor UOP.  Trend CK levels.     Problem: Acute on chronic renal failure   Differential DX: Obstructive vs intrinsic process vs decreased PO intake   Chronic issues affecting care: age  Radiology reports reviewed and/or personal interpretation: as above   Tests considered or ordered: labs  Plan of care: last month Cr level 1.4 so above baseline.  IVFs and follow labs.       Problem: Left Lower Abdominal pain   Differential DX: obstruction vs enteritis   Chronic issues affecting care: age  Radiology reports reviewed and/or personal interpretation: CT report as above   Tests considered or ordered: repeat imaging or Xrays  Plan of care: Monitor nausea and PO intake, non contrasted CT may still be an issue.  After proper hydration can consider Oral contrasted CT if no resolution.  Plain films also can monitor for SBO.     Problem: Nausea   Differential DX: obstruction vs infection   Chronic issues affecting care: age  Radiology reports reviewed and/or personal interpretation: as above   Tests considered or ordered: imaging, labs   Plan of care: Monitor PO intake, treat with meds prn.  May just be affect of dehydration and renal failure. See how she responds after hydration.         Chronic medical issues addressed during this admission and plan of care:  Hypothyroidism - resume meds  Hx of CVA - likely can come off plavix and use just ASA  HTN - resume and adjust home meds as needed according to BP and renal function       Further evaluation and treatment will be contingent on the response to the above therapy as well as the input from the consultants.  Findings for this admission discussed with patient/patient's family/ER physician.  Total time spent with this admission was 32 mins including discussion with ER physician, Chart review of previous labs and imaging if available, discussion with patient or patient's family the plan of care and possible outcomes.          Anticipate that the patient will require more  than 2 midnights for this hospital stay and the patient will be admitted to Inpatient status.    Code Status: Full discussed with son who states he is MPOA

## 2023-08-22 LAB
ADV 40+41 DNA STL QL NAA+NON-PROBE: NOT DETECTED
ALBUMIN SERPL-MCNC: 2.9 G/DL (ref 3.4–5)
ALBUMIN/GLOB SERPL: 1.1 RATIO
ALP SERPL-CCNC: 59 UNIT/L (ref 50–144)
ALT SERPL-CCNC: 26 UNIT/L (ref 1–45)
ANION GAP SERPL CALC-SCNC: 6 MEQ/L (ref 2–13)
AST SERPL-CCNC: 109 UNIT/L (ref 14–36)
ASTRO TYP 1-8 RNA STL QL NAA+NON-PROBE: NOT DETECTED
BASOPHILS # BLD AUTO: 0.05 X10(3)/MCL (ref 0.01–0.08)
BASOPHILS NFR BLD AUTO: 0.5 % (ref 0.1–1.2)
BILIRUB SERPL-MCNC: 0.3 MG/DL (ref 0–1)
BUN SERPL-MCNC: 38 MG/DL (ref 7–20)
C CAYETANENSIS DNA STL QL NAA+NON-PROBE: NOT DETECTED
C COLI+JEJ+UPSA DNA STL QL NAA+NON-PROBE: NOT DETECTED
CALCIUM SERPL-MCNC: 8.4 MG/DL (ref 8.4–10.2)
CHLORIDE SERPL-SCNC: 110 MMOL/L (ref 98–110)
CK SERPL-CCNC: 2201 U/L (ref 30–135)
CO2 SERPL-SCNC: 21 MMOL/L (ref 21–32)
CONSISTENCY STL: NORMAL
CREAT SERPL-MCNC: 1.6 MG/DL (ref 0.66–1.25)
CREAT/UREA NIT SERPL: 24 (ref 12–20)
CRYPTOSP DNA STL QL NAA+NON-PROBE: NOT DETECTED
E HISTOLYT DNA STL QL NAA+NON-PROBE: NOT DETECTED
EAEC PAA PLAS AGGR+AATA ST NAA+NON-PRB: NOT DETECTED
EC STX1+STX2 GENES STL QL NAA+NON-PROBE: NOT DETECTED
EOSINOPHIL # BLD AUTO: 0.26 X10(3)/MCL (ref 0.04–0.36)
EOSINOPHIL NFR BLD AUTO: 2.7 % (ref 0.7–7)
EPEC EAE GENE STL QL NAA+NON-PROBE: NOT DETECTED
ERYTHROCYTE [DISTWIDTH] IN BLOOD BY AUTOMATED COUNT: 13.7 % (ref 11–14.5)
ETEC LTA+ST1A+ST1B TOX ST NAA+NON-PROBE: NOT DETECTED
G LAMBLIA DNA STL QL NAA+NON-PROBE: NOT DETECTED
GFR SERPLBLD CREATININE-BSD FMLA CKD-EPI: 31 MLS/MIN/1.73/M2
GLOBULIN SER-MCNC: 2.7 GM/DL (ref 2–3.9)
GLUCOSE SERPL-MCNC: 61 MG/DL (ref 70–115)
HCT VFR BLD AUTO: 34.8 % (ref 36–48)
HGB BLD-MCNC: 10.9 G/DL (ref 11.8–16)
IMM GRANULOCYTES # BLD AUTO: 0.03 X10(3)/MCL (ref 0–0.03)
IMM GRANULOCYTES NFR BLD AUTO: 0.3 % (ref 0–0.5)
LYMPHOCYTES # BLD AUTO: 2.94 X10(3)/MCL (ref 1.16–3.74)
LYMPHOCYTES NFR BLD AUTO: 30.5 % (ref 20–55)
MCH RBC QN AUTO: 28.4 PG (ref 27–34)
MCHC RBC AUTO-ENTMCNC: 31.3 G/DL (ref 31–37)
MCV RBC AUTO: 90.6 FL (ref 79–99)
MONOCYTES # BLD AUTO: 0.7 X10(3)/MCL (ref 0.24–0.36)
MONOCYTES NFR BLD AUTO: 7.3 % (ref 4.7–12.5)
NEUTROPHILS # BLD AUTO: 5.66 X10(3)/MCL (ref 1.56–6.13)
NEUTROPHILS NFR BLD AUTO: 58.7 % (ref 37–73)
NOROVIRUS GI+II RNA STL QL NAA+NON-PROBE: NOT DETECTED
NRBC BLD AUTO-RTO: 0 %
P SHIGELLOIDES DNA STL QL NAA+NON-PROBE: NOT DETECTED
PLATELET # BLD AUTO: 164 X10(3)/MCL (ref 140–371)
PMV BLD AUTO: 10.3 FL (ref 9.4–12.4)
POCT GLUCOSE: 151 MG/DL (ref 70–110)
POCT GLUCOSE: 219 MG/DL (ref 70–110)
POCT GLUCOSE: 246 MG/DL (ref 70–110)
POCT GLUCOSE: 93 MG/DL (ref 70–110)
POTASSIUM SERPL-SCNC: 4.5 MMOL/L (ref 3.5–5.1)
PROT SERPL-MCNC: 5.6 GM/DL (ref 6.3–8.2)
RBC # BLD AUTO: 3.84 X10(6)/MCL (ref 4–5.1)
RVA RNA STL QL NAA+NON-PROBE: NOT DETECTED
S ENT+BONG DNA STL QL NAA+NON-PROBE: NOT DETECTED
SAPO I+II+IV+V RNA STL QL NAA+NON-PROBE: NOT DETECTED
SHIGELLA SP+EIEC IPAH ST NAA+NON-PROBE: NOT DETECTED
SODIUM SERPL-SCNC: 137 MMOL/L (ref 135–145)
V CHOL+PARA+VUL DNA STL QL NAA+NON-PROBE: NOT DETECTED
V CHOLERAE DNA STL QL NAA+NON-PROBE: NOT DETECTED
WBC # SPEC AUTO: 9.64 X10(3)/MCL (ref 4–11.5)
Y ENTEROCOL DNA STL QL NAA+NON-PROBE: NOT DETECTED

## 2023-08-22 PROCEDURE — 25000003 PHARM REV CODE 250: Performed by: FAMILY MEDICINE

## 2023-08-22 PROCEDURE — 63600175 PHARM REV CODE 636 W HCPCS: Performed by: INTERNAL MEDICINE

## 2023-08-22 PROCEDURE — 36415 COLL VENOUS BLD VENIPUNCTURE: CPT | Performed by: FAMILY MEDICINE

## 2023-08-22 PROCEDURE — 87507 IADNA-DNA/RNA PROBE TQ 12-25: CPT | Performed by: FAMILY MEDICINE

## 2023-08-22 PROCEDURE — 97116 GAIT TRAINING THERAPY: CPT

## 2023-08-22 PROCEDURE — 80053 COMPREHEN METABOLIC PANEL: CPT | Performed by: FAMILY MEDICINE

## 2023-08-22 PROCEDURE — 85025 COMPLETE CBC W/AUTO DIFF WBC: CPT | Performed by: FAMILY MEDICINE

## 2023-08-22 PROCEDURE — 82550 ASSAY OF CK (CPK): CPT | Performed by: FAMILY MEDICINE

## 2023-08-22 PROCEDURE — 51702 INSERT TEMP BLADDER CATH: CPT

## 2023-08-22 PROCEDURE — 97161 PT EVAL LOW COMPLEX 20 MIN: CPT

## 2023-08-22 PROCEDURE — 63600175 PHARM REV CODE 636 W HCPCS: Performed by: FAMILY MEDICINE

## 2023-08-22 PROCEDURE — 11000001 HC ACUTE MED/SURG PRIVATE ROOM

## 2023-08-22 PROCEDURE — 94761 N-INVAS EAR/PLS OXIMETRY MLT: CPT

## 2023-08-22 RX ORDER — IBUPROFEN 200 MG
24 TABLET ORAL
Status: DISCONTINUED | OUTPATIENT
Start: 2023-08-22 | End: 2023-08-24 | Stop reason: HOSPADM

## 2023-08-22 RX ORDER — MIRTAZAPINE 15 MG/1
15 TABLET, FILM COATED ORAL NIGHTLY
Status: DISCONTINUED | OUTPATIENT
Start: 2023-08-22 | End: 2023-08-24 | Stop reason: HOSPADM

## 2023-08-22 RX ORDER — INSULIN ASPART 100 [IU]/ML
0-5 INJECTION, SOLUTION INTRAVENOUS; SUBCUTANEOUS
Status: DISCONTINUED | OUTPATIENT
Start: 2023-08-22 | End: 2023-08-24 | Stop reason: HOSPADM

## 2023-08-22 RX ORDER — GLUCAGON 1 MG
1 KIT INJECTION
Status: DISCONTINUED | OUTPATIENT
Start: 2023-08-22 | End: 2023-08-24 | Stop reason: HOSPADM

## 2023-08-22 RX ORDER — LEVOTHYROXINE SODIUM 75 UG/1
75 TABLET ORAL
Status: DISCONTINUED | OUTPATIENT
Start: 2023-08-22 | End: 2023-08-24 | Stop reason: HOSPADM

## 2023-08-22 RX ORDER — IBUPROFEN 200 MG
16 TABLET ORAL
Status: DISCONTINUED | OUTPATIENT
Start: 2023-08-22 | End: 2023-08-24 | Stop reason: HOSPADM

## 2023-08-22 RX ADMIN — LISINOPRIL 20 MG: 10 TABLET ORAL at 08:08

## 2023-08-22 RX ADMIN — SODIUM CHLORIDE: 9 INJECTION, SOLUTION INTRAVENOUS at 08:08

## 2023-08-22 RX ADMIN — LEVOTHYROXINE SODIUM 75 MCG: 0.07 TABLET ORAL at 05:08

## 2023-08-22 RX ADMIN — CEFTRIAXONE SODIUM 1 G: 1 INJECTION, POWDER, FOR SOLUTION INTRAMUSCULAR; INTRAVENOUS at 05:08

## 2023-08-22 RX ADMIN — PANTOPRAZOLE SODIUM 40 MG: 40 TABLET, DELAYED RELEASE ORAL at 08:08

## 2023-08-22 RX ADMIN — INSULIN ASPART 2 UNITS: 100 INJECTION, SOLUTION INTRAVENOUS; SUBCUTANEOUS at 05:08

## 2023-08-22 RX ADMIN — AMLODIPINE BESYLATE 5 MG: 5 TABLET ORAL at 08:08

## 2023-08-22 RX ADMIN — SODIUM CHLORIDE: 9 INJECTION, SOLUTION INTRAVENOUS at 06:08

## 2023-08-22 RX ADMIN — MAGNESIUM OXIDE TAB 400 MG (241.3 MG ELEMENTAL MG) 400 MG: 400 (241.3 MG) TAB at 08:08

## 2023-08-22 RX ADMIN — SERTRALINE HYDROCHLORIDE 100 MG: 50 TABLET ORAL at 08:08

## 2023-08-22 RX ADMIN — INSULIN ASPART 1 UNITS: 100 INJECTION, SOLUTION INTRAVENOUS; SUBCUTANEOUS at 08:08

## 2023-08-22 RX ADMIN — ENOXAPARIN SODIUM 30 MG: 30 INJECTION SUBCUTANEOUS at 05:08

## 2023-08-22 RX ADMIN — TRAZODONE HYDROCHLORIDE 50 MG: 50 TABLET ORAL at 08:08

## 2023-08-22 RX ADMIN — MIRTAZAPINE 15 MG: 15 TABLET, FILM COATED ORAL at 08:08

## 2023-08-22 RX ADMIN — CLOPIDOGREL BISULFATE 75 MG: 75 TABLET ORAL at 08:08

## 2023-08-22 NOTE — PLAN OF CARE
Problem: Adult Inpatient Plan of Care  Goal: Plan of Care Review  Outcome: Ongoing, Progressing  Goal: Patient-Specific Goal (Individualized)  Outcome: Ongoing, Progressing  Goal: Absence of Hospital-Acquired Illness or Injury  Outcome: Ongoing, Progressing  Goal: Optimal Comfort and Wellbeing  Outcome: Ongoing, Progressing  Goal: Readiness for Transition of Care  Outcome: Ongoing, Progressing     Problem: Diabetes Comorbidity  Goal: Blood Glucose Level Within Targeted Range  Outcome: Ongoing, Progressing     Problem: Fall Injury Risk  Goal: Absence of Fall and Fall-Related Injury  Outcome: Ongoing, Progressing     Problem: Impaired Wound Healing  Goal: Optimal Wound Healing  Outcome: Ongoing, Progressing     Problem: Skin Injury Risk Increased  Goal: Skin Health and Integrity  Outcome: Ongoing, Progressing

## 2023-08-22 NOTE — PLAN OF CARE
08/22/23 1132   Discharge Assessment   Assessment Type Discharge Planning Assessment   Confirmed/corrected address, phone number and insurance Yes   Confirmed Demographics Correct on Facesheet   Source of Information family   When was your last doctors appointment? 08/21/23   Communicated JOSEPH with patient/caregiver Yes   People in Home child(catrachita), adult   Do you expect to return to your current living situation? Yes   Prior to hospitilization cognitive status: Alert/Oriented   Current cognitive status: Alert/Oriented   Walking or Climbing Stairs ambulation difficulty, requires equipment   Mobility Management Rolling Walker   Dressing/Bathing bathing difficulty, assistance 1 person;dressing difficulty, assistance 1 person   Dressing/Bathing Management CNA from [facility] assists with bathing, Family assists with dressing   Equipment Currently Used at Home hospital bed;walker, rolling;wheelchair   Readmission within 30 days? No   Do you currently have service(s) that help you manage your care at home? Yes   Name and Contact number of agency [facility]  [phone]   Is the pt/caregiver preference to resume services with current agency Yes   Do you take prescription medications? Yes   Do you have prescription coverage? Yes   Coverage Medicare   Do you have any problems affording any of your prescribed medications? No   Is the patient taking medications as prescribed? yes   Who is going to help you get home at discharge? Son   How do you get to doctors appointments? family or friend will provide   Are you on dialysis? No   Do you take coumadin? No   DME Needed Upon Discharge  none   Discharge Plan discussed with: Adult children   Transition of Care Barriers None   Discharge Plan A Hospice/home   Physical Activity   On average, how many days per week do you engage in moderate to strenuous exercise (like a brisk walk)? 0 days   On average, how many minutes do you engage in exercise at this level? 0 min   Financial  Resource Strain   How hard is it for you to pay for the very basics like food, housing, medical care, and heating? Not hard   Housing Stability   In the last 12 months, was there a time when you were not able to pay the mortgage or rent on time? N   In the last 12 months, how many places have you lived? 1   In the last 12 months, was there a time when you did not have a steady place to sleep or slept in a shelter (including now)? N   Transportation Needs   In the past 12 months, has lack of transportation kept you from medical appointments or from getting medications? no   In the past 12 months, has lack of transportation kept you from meetings, work, or from getting things needed for daily living? No   Food Insecurity   Within the past 12 months, you worried that your food would run out before you got the money to buy more. Never true   Within the past 12 months, the food you bought just didn't last and you didn't have money to get more. Never true   Stress   Do you feel stress - tense, restless, nervous, or anxious, or unable to sleep at night because your mind is troubled all the time - these days? To some exte   Social Connections   In a typical week, how many times do you talk on the phone with family, friends, or neighbors? Once a week   How often do you get together with friends or relatives? More than 3   How often do you attend Cheondoism or Zoroastrian services? More than 4   Do you belong to any clubs or organizations such as Cheondoism groups, unions, fraternal or athletic groups, or school groups? No   How often do you attend meetings of the clubs or organizations you belong to? Never   Are you , , , , never , or living with a partner?    Alcohol Use   Q1: How often do you have a drink containing alcohol? Never   Q2: How many drinks containing alcohol do you have on a typical day when you are drinking? None   Q3: How often do you have six or more drinks on one  occasion? Never   OTHER   Name(s) of People in Home Antonio Allen-Son     Patient's son given Hospice Names.  He is to discuss choice with the family and call me with their decision.

## 2023-08-22 NOTE — PT/OT/SLP EVAL
Physical Therapy Evaluation    Patient Name:  Mariajose Allen   MRN:  [id number]    Recommendations:     Discharge Recommendations: home with home health, home health PT, nursing facility, skilled   Discharge Equipment Recommendations: none   Barriers to discharge: None    Assessment:     Mariajose Allen is a 89 y.o. female admitted with a medical diagnosis of <principal problem not specified>.  She presents with the following impairments/functional limitations: weakness, impaired endurance, impaired functional mobility, gait instability, impaired balance, decreased lower extremity function, decreased safety awareness     Patient tolerated gait training with RW with min A for safety. Patient then sat up in chair and performed BLE therex. .    Rehab Prognosis: Good; patient would benefit from acute skilled PT services to address these deficits and reach maximum level of function.    Recent Surgery: * No surgery found *      Plan:     During this hospitalization, patient to be seen 5 x/week (5-6x weekly/1-2x daily) to address the identified rehab impairments via gait training, therapeutic activities, therapeutic exercises and progress toward the following goals:    Plan of Care Expires:  09/21/23    Subjective     Chief Complaint: weakness  Patient/Family Comments/goals: to get stronger  Pain/Comfort:       Patients cultural, spiritual, Nondenominational conflicts given the current situation:      Living Environment:  Lives at [facility]  Prior to admission, patients level of function was independent.  Equipment used at home: hospital bed, walker, rolling, wheelchair.  DME owned (not currently used): none.  Upon discharge, patient will have assistance from family.    Objective:     Communicated with nursing prior to session.  Patient found HOB elevated with peripheral IV  upon PT entry to room.    General Precautions: Standard, fall  Orthopedic Precautions:    Braces: N/A  Respiratory Status: Room air    Exams:  RLE Strength:  grossly 3+/5  LLE Strength: grossly 3+/5    Functional Mobility:  Bed Mobility:     Supine to Sit: moderate assistance  Transfers:     Sit to Stand:  minimum assistance and moderate assistance with rolling walker  Gait: 30 feet with RW with min A       AM-PAC 6 CLICK MOBILITY  Total Score:        Treatment & Education:  See above. Call bell and safety education    Patient left up in chair with all lines intact, call button in reach, chair alarm on, nurse notified, and daughter present.    GOALS:   Multidisciplinary Problems       Physical Therapy Goals          Problem: Physical Therapy    Goal Priority Disciplines Outcome Goal Variances Interventions   Physical Therapy Goal     PT, PT/OT Ongoing, Progressing     Description: Goals to be met by: discharge     Patient will increase functional independence with mobility by performin. Supine to sit with Contact Guard Assistance  2. Sit to stand transfer with Contact Guard Assistance  3. Gait  x 100 feet with Contact Guard Assistance using Rolling Walker.                          History:     Past Medical History:   Diagnosis Date    Anxiety disorder, unspecified     CHF (congestive heart failure)     Chronic kidney disease, unspecified     COVID-19     Dementia     Diabetes mellitus     Dysphagia     Falls     GERD (gastroesophageal reflux disease)     Hemiparesis     Hypertension     Mixed hyperlipidemia     Renal disorder     Small bowel obstruction     Stroke     Thyroid disease     Unspecified macular degeneration        No past surgical history on file.    Time Tracking:     PT Received On: 23  PT Start Time: 1245     PT Stop Time: 1315  PT Total Time (min): 30 min     Billable Minutes: Evaluation 15 and Gait Training 15      2023

## 2023-08-22 NOTE — PROGRESS NOTES
Hospital Medicine  Progress Note    Patient Name: Mariajose Allen  MRN: [id number]  Status: IP- Inpatient   Admission Date: 8/21/2023  Length of Stay: 1  Date of Service: 08/22/2023       CC: hospital follow-up for        SUBJECTIVE   89 y.o. female with hx of HTN, hx of CVA, Hypothyroidism, GERD, possible hx of Dementia who presented with nausea and decreased appetite.  Hard to quantify how long, patient says nausea has been present for a while but did not narrow down a time frame.  She denies vomiting just the nausea.  No fevers or chills.  Her son reports a loose/soft BM this am that the patient described a dark with a possible trace of blood.  She denies CP or SOB.  No recent sick contacts.  She denies abdominal pain when asked, however on exam seems to have some tenderness in lower and left lower abdomen.  No report of any urinary symptoms.    08/22/2023  More awake and alert this AM  Lab improve  Renal fxn improving  Want to try and eat something       Today: Patient seen and examined at bedside, and chart reviewed.       MEDICATIONS   Scheduled   amLODIPine  5 mg Oral Daily    cefTRIAXone (ROCEPHIN) IVPB  1 g Intravenous Q12H    clopidogreL  75 mg Oral Daily    enoxparin  30 mg Subcutaneous Daily    levothyroxine  75 mcg Oral Before breakfast    lisinopriL  20 mg Oral Daily    magnesium oxide  400 mg Oral Daily    pantoprazole  40 mg Oral Daily    sertraline  100 mg Oral Daily    traZODone  50 mg Oral QHS     Continuous Infusions   sodium chloride 0.9% 100 mL/hr at 08/22/23 0859         PHYSICAL EXAM   VITALS: T 97.3 °F (36.3 °C)   BP (!) 147/51   P 66   RR 20   O2 97 %    GENERAL: Awake and in NAD  LUNGS: CTA B/L  CVS: Normal rate  GI/: Soft, NT, bowel sounds positive.  EXTREMITIES: No peripheral edema  NEURO: AAOx3  PSYCH: Cooperative      LABS   CBC  Recent Labs     08/21/23  1517 08/22/23  0504   WBC 9.56 9.64   RBC 4.35 3.84*   HGB 12.7 10.9*   HCT 39.6 34.8*   MCV 91.0 90.6   MCH 29.2 28.4   MCHC 32.1 31.3    RDW 13.7 13.7    164     CHEM  Recent Labs     08/21/23  1517 08/22/23  0504    137   K 5.1 4.5   CHLORIDE 103 110   CO2 25 21   BUN 44.0* 38.0*   CREATININE 2.06* 1.60*   GLUCOSE 192* 61*   CALCIUM 9.4 8.4   ALBUMIN 3.9 2.9*   GLOBULIN 3.3 2.7   ALKPHOS 74 59   ALT 34 26   AST 97* 109*   BILITOT 0.5 0.3   LIPASE 41  --          MICROBIOLOGY     Microbiology Results (last 7 days)       Procedure Component Value Units Date/Time    Urine culture [[id number]] Collected: 08/21/23 1643    Order Status: Sent Specimen: Urine, Catheterized Updated: 08/21/23 1701              DIAGNOSTICS   X-Ray Chest AP Portable  Narrative: EXAMINATION:  STUDY: XR CHEST AP PORTABLE    CLINICAL HISTORY AND TECHNIQUE:  Arvind Dailey, RT on 8/21/2023  3:43 PM    CLINICAL HX: ER PT    X 1 WEEK    C/O GENERALZIED WEAKNESS, LOSS OF APPETITE, NAUSEA    PAST MEDICAL HX: CV (+)    TECHNIQUE: 1V PORTABLE CHEST    TECH: AKG/DB/NN    PT TRANSPORTED WO INCIDENT    COMPARISON:  None    FINDINGS:  The lungs are slightly under expanded.The cardiac, hilar, and mediastinal contours appear unremarkable.I see no lobar or segmental infiltrates.No significant pleural effusions are noted.There is moderate demineralization of the skeletal structures with moderate degenerative changes noted throughout the thoracic spine.  Atherosclerotic calcifications are noted within the aortic arch.  Impression: 1. I see no lobar or segmental infiltrates or other significant abnormalities.See above comments.    Electronically signed by: Gonzalez Babin  Date:    08/21/2023  Time:    15:51  CT Abdomen Pelvis  Without Contrast  Narrative: EXAMINATION:  CT ABDOMEN PELVIS WITHOUT CONTRAST    CLINICAL HISTORY AND TECHNIQUE:  Anamaria Lawson, RT on 8/21/2023  3:33 PM    PT STATUS: ER    PROCEDURE: CT ABD/PELVIS WO    CLINICAL HX : X'S 1 WEEK, WEAKNESS, BLOOD IN STOOL & NAUSEA    PMH: CVA    IV CONTRAST: NONE    ORAL CONTRAST: NONE    RECTAL CONTRAST: NONE    AXIAL IMAGES @  5MM INTERVALS WITH MULTIPLANAR RECONSTRUCTION    TOTAL IMAGE NUMBER: 153    NUMBER OF CT SCANS IN PAST 12 MONTHS: 1    CTDIvol(mGy): HEAD:     BODY: 6.70    DLP(mGycm): HEAD:     BODY:  364.20    TECH: AKG/LEYLA    PT TRANSPORTED W/O INCIDENT    This patient has had 1 CT and nuclear medicine scans performed within the last 12 months.    The following DOSE REDUCTION TECHNIQUES are used for all CT scans at [facility]:    1. Automated exposure control.  2. Adjustment of the mA and/or kv according to patient size.  3. Use of iterative reconstruction technique.    COMPARISON:  08/28/2020    FINDINGS:  Liver: No clinically significant abnormalities are noted.    Gallbladder/biliary system: Previous cholecystectomy.    Spleen: Vascular calcifications are noted within the splenic artery and splenic hilum.  Coarse parenchymal calcifications are noted within the splenic parenchyma as can be seen with old granulomatous disease.    Adrenal glands: No clinically significant abnormalities are noted.    Pancreas: The pancreas is moderately atrophic with no worrisome inflammatory changes or focal masses appreciated.    Kidneys/ureters: There is moderate atrophy of both kidneys with no worrisome focal parenchymal abnormalities appreciated.  I see no obvious ureteral stones or changes to suggest ureteral obstruction.    Urinary bladder: The urinary bladder is poorly distended and difficult to evaluate with no definite abnormalities noted.    Uterus and ovaries: The patient is post hysterectomy.    GI tract: Unopacified loops of large and small bowel as well as the gastric lumen and appendix are difficult to evaluate with no worrisome abnormalities noted.    Vascular structures: Moderate atherosclerotic plaquing is noted throughout the abdominal aorta is primary branches.    Musculoskeletal structures: There is significant demineralization of the skeletal structures with a moderate, levoconvex, scoliotic curvature  of the lumbar spine and fairly prominent degenerative changes noted involving the lower thoracic and lumbar spine.    Miscellaneous: Electronic stimulator is noted within the subcutaneous fat of the right flank with the distal tip of the lead located within the region of the left sacral wing.  Impression: 1. Chronic changes are present as described above and as seen previously.  See above comments.    Electronically signed by: Gonzalez Babin  Date:    08/21/2023  Time:    15:38        ASSESSMENT/PLAN:   Problem: Non-traumatic Rhabdomyolysis  Differential Dx: NA  Chronic issues affecting care: age, intake   Radiology reports reviewed and/or personal interpretation: report reviewed as above   Tests considered or ordered: labs  Plan of care: IVFs and monitor UOP.  Trend CK levels.   Cont ivfs  CK up to 2201     Problem: Acute on chronic renal failure   Differential DX: Obstructive vs intrinsic process vs decreased PO intake   Chronic issues affecting care: age  Radiology reports reviewed and/or personal interpretation: as above   Tests considered or ordered: labs  Plan of care: last month Cr level 1.4 so above baseline.  IVFs and follow labs.   Improved BUN Cr down to   38 & 1.60        Problem: Left Lower Abdominal pain   Differential DX: obstruction vs enteritis   Chronic issues affecting care: age  Radiology reports reviewed and/or personal interpretation: CT report as above   Tests considered or ordered: repeat imaging or Xrays  Plan of care: Monitor nausea and PO intake, non contrasted CT may still be an issue.  After proper hydration can consider Oral contrasted CT if no resolution.  Plain films also can monitor for SBO.      Problem: Nausea   Differential DX: obstruction vs infection   Chronic issues affecting care: age  Radiology reports reviewed and/or personal interpretation: as above   Tests considered or ordered: imaging, labs   Plan of care: Monitor PO intake, treat with meds prn.  May just be affect of  dehydration and renal failure. See how she responds after hydration.            Chronic medical issues addressed during this admission and plan of care:  Hypothyroidism - resume meds  Hx of CVA - likely can come off plavix and use just ASA  HTN - resume and adjust home meds as needed according to BP and renal function             Dalton Thomas MD  [facility]

## 2023-08-22 NOTE — PLAN OF CARE
Problem: Physical Therapy  Goal: Physical Therapy Goal  Description: Goals to be met by: discharge     Patient will increase functional independence with mobility by performin. Supine to sit with Contact Guard Assistance  2. Sit to stand transfer with Contact Guard Assistance  3. Gait  x 100 feet with Contact Guard Assistance using Rolling Walker.     Outcome: Ongoing, Progressing

## 2023-08-22 NOTE — PROGRESS NOTES
[facility]-Med/Surg  Wound Care    Patient Name:  Mariajose Allen   MRN:  [id number]  Date: 8/22/2023  Diagnosis: <principal problem not specified>    History:     Past Medical History:   Diagnosis Date    Anxiety disorder, unspecified     CHF (congestive heart failure)     Chronic kidney disease, unspecified     COVID-19     Dementia     Diabetes mellitus     Dysphagia     Falls     GERD (gastroesophageal reflux disease)     Hemiparesis     Hypertension     Mixed hyperlipidemia     Renal disorder     Small bowel obstruction     Stroke     Thyroid disease     Unspecified macular degeneration        Social History     Socioeconomic History    Marital status:    Tobacco Use    Smoking status: Never    Smokeless tobacco: Never     Social Determinants of Health     Financial Resource Strain: Low Risk  (8/22/2023)    Overall Financial Resource Strain (CARDIA)     Difficulty of Paying Living Expenses: Not hard at all   Food Insecurity: No Food Insecurity (8/22/2023)    Hunger Vital Sign     Worried About Running Out of Food in the Last Year: Never true     Ran Out of Food in the Last Year: Never true   Transportation Needs: No Transportation Needs (8/22/2023)    PRAPARE - Transportation     Lack of Transportation (Medical): No     Lack of Transportation (Non-Medical): No   Physical Activity: Inactive (8/22/2023)    Exercise Vital Sign     Days of Exercise per Week: 0 days     Minutes of Exercise per Session: 0 min   Stress: Stress Concern Present (8/22/2023)    [place] [employer] - Occupational Stress Questionnaire     Feeling of Stress : To some extent   Social Connections: Moderately Isolated (8/22/2023)    Social Connection and Isolation Panel [NHANES]     Frequency of Communication with Friends and Family: Once a week     Frequency of Social Gatherings with Friends and Family: More than three times a week     Attends Adventism Services: More than 4 times per year     Active Member  of Clubs or Organizations: No     Attends Club or Organization Meetings: Never     Marital Status:    Housing Stability: Low Risk  (8/22/2023)    Housing Stability Vital Sign     Unable to Pay for Housing in the Last Year: No     Number of Places Lived in the Last Year: 1     Unstable Housing in the Last Year: No       Precautions:     Allergies as of 08/21/2023 - Reviewed 08/21/2023   Allergen Reaction Noted    Clindamycin  04/04/2023    Iodinated contrast media  04/04/2023    Povidone-iodine  04/04/2023       WOC Assessment Details/Treatment        08/22/23 1200   WOCN Assessment   WOCN Total Time (mins) 40   Visit Date 08/22/23   Visit Time 1130   Consult Type New   WOCN Speciality Wound   Intervention assessed;changed;applied   Teaching on-going   Skin Interventions   Pressure Reduction Devices pressure-redistributing mattress utilized   Pressure Reduction Techniques frequent weight shift encouraged   Positioning   Body Position position maintained   Pressure Injury Prevention    Check Moisture Management Pad Done   Sacral Foam Dressing Replace        Altered Skin Integrity 08/21/23 1730 Left anterior Abdomen #1 Moisture associated dermatitis   Date First Assessed/Time First Assessed: 08/21/23 1730   Altered Skin Integrity Present on Admission - Did Patient arrive to the hospital with altered skin?: yes  Side: Left  Orientation: anterior  Location: Abdomen  Wound Number: #1  Primary Wound Ty...   Wound Image    Dressing Appearance Open to air   Drainage Amount None   Appearance Intact;Red   Care Applied:  (antifungal cream)        Altered Skin Integrity 08/21/23 1730 Left medial Buttocks #2 Partial thickness tissue loss. Shallow open ulcer with a red or pink wound bed, without slough. Intact or Open/Ruptured Serum-filled blister.   Date First Assessed/Time First Assessed: 08/21/23 1730   Altered Skin Integrity Present on Admission - Did Patient arrive to the hospital with altered skin?: yes  Side: Left   Orientation: medial  Location: Buttocks  Wound Number: #2  Description of Al...   Wound Image      Description of Altered Skin Integrity Partial thickness tissue loss. Shallow open ulcer with a red or pink wound bed, without slough. Intact or Open/Ruptured Serum-filled blister.   Dressing Appearance Dry;Intact;Clean   Drainage Amount Scant   Drainage Characteristics/Odor Serosanguineous;No odor   Appearance Red;Moist   Periwound Area Purple  (blanchable.  This purple discoloration extends to bilateral buttocks.)   Wound Edges Defined   Wound Length (cm) 0.9 cm   Wound Width (cm) 0.7 cm   Wound Depth (cm) 0.1 cm   Wound Volume (cm^3) 0.063 cm^3   Wound Surface Area (cm^2) 0.63 cm^2   Care Cleansed with:;Sterile normal saline   Dressing Applied;Foam   Dressing Change Due 08/25/23        Altered Skin Integrity 08/21/23 1730 Left lower Breast #5 Moisture associated dermatitis   Date First Assessed/Time First Assessed: 08/21/23 1730   Altered Skin Integrity Present on Admission - Did Patient arrive to the hospital with altered skin?: yes  Side: Left  Orientation: lower  Location: Breast  Wound Number: #5  Primary Wound Type: ...   Wound Image    Dressing Appearance Open to air   Drainage Amount None   Appearance Intact;Red   Care Applied:  (antifungal cream)        Altered Skin Integrity 08/21/23 1730 Right lower Breast #6 Moisture associated dermatitis   Date First Assessed/Time First Assessed: 08/21/23 1730   Altered Skin Integrity Present on Admission - Did Patient arrive to the hospital with altered skin?: yes  Side: Right  Orientation: lower  Location: Breast  Wound Number: #6  Primary Wound Type:...   Wound Image    Dressing Appearance Open to air   Drainage Amount None   Appearance Intact;Red   Care Applied:  (antifungal cream)        Altered Skin Integrity 08/21/23 1730 upper Labia #7 Moisture associated dermatitis   Date First Assessed/Time First Assessed: 08/21/23 1730   Altered Skin Integrity Present on  Admission - Did Patient arrive to the hospital with altered skin?: yes  Orientation: upper  Location: Labia  Wound Number: #7  Primary Wound Type: Moisture asso...   Wound Image    Dressing Appearance Open to air   Drainage Amount Scant   Drainage Characteristics/Odor Serosanguineous;No odor   Appearance Red;Intact;Moist   Tissue loss description Partial thickness   Wound Edges Defined   Care Cleansed with:  (bathing wipes then applied barrier cream)       Orders placed.     08/22/2023

## 2023-08-23 LAB
ANION GAP SERPL CALC-SCNC: 5 MEQ/L (ref 2–13)
BASOPHILS # BLD AUTO: 0.06 X10(3)/MCL (ref 0.01–0.08)
BASOPHILS NFR BLD AUTO: 0.6 % (ref 0.1–1.2)
BUN SERPL-MCNC: 23 MG/DL (ref 7–20)
CALCIUM SERPL-MCNC: 8.1 MG/DL (ref 8.4–10.2)
CHLORIDE SERPL-SCNC: 109 MMOL/L (ref 98–110)
CO2 SERPL-SCNC: 22 MMOL/L (ref 21–32)
CREAT SERPL-MCNC: 1.32 MG/DL (ref 0.66–1.25)
CREAT/UREA NIT SERPL: 17 (ref 12–20)
EOSINOPHIL # BLD AUTO: 0.36 X10(3)/MCL (ref 0.04–0.36)
EOSINOPHIL NFR BLD AUTO: 3.7 % (ref 0.7–7)
ERYTHROCYTE [DISTWIDTH] IN BLOOD BY AUTOMATED COUNT: 13.8 % (ref 11–14.5)
GFR SERPLBLD CREATININE-BSD FMLA CKD-EPI: 39 MLS/MIN/1.73/M2
GLUCOSE SERPL-MCNC: 135 MG/DL (ref 70–115)
HCT VFR BLD AUTO: 34.6 % (ref 36–48)
HGB BLD-MCNC: 11 G/DL (ref 11.8–16)
IMM GRANULOCYTES # BLD AUTO: 0.02 X10(3)/MCL (ref 0–0.03)
IMM GRANULOCYTES NFR BLD AUTO: 0.2 % (ref 0–0.5)
LYMPHOCYTES # BLD AUTO: 2.36 X10(3)/MCL (ref 1.16–3.74)
LYMPHOCYTES NFR BLD AUTO: 24.3 % (ref 20–55)
MCH RBC QN AUTO: 28.7 PG (ref 27–34)
MCHC RBC AUTO-ENTMCNC: 31.8 G/DL (ref 31–37)
MCV RBC AUTO: 90.3 FL (ref 79–99)
MONOCYTES # BLD AUTO: 0.58 X10(3)/MCL (ref 0.24–0.36)
MONOCYTES NFR BLD AUTO: 6 % (ref 4.7–12.5)
NEUTROPHILS # BLD AUTO: 6.33 X10(3)/MCL (ref 1.56–6.13)
NEUTROPHILS NFR BLD AUTO: 65.2 % (ref 37–73)
NRBC BLD AUTO-RTO: 0 %
PLATELET # BLD AUTO: 149 X10(3)/MCL (ref 140–371)
PMV BLD AUTO: 10.3 FL (ref 9.4–12.4)
POCT GLUCOSE: 164 MG/DL (ref 70–110)
POCT GLUCOSE: 177 MG/DL (ref 70–110)
POCT GLUCOSE: 183 MG/DL (ref 70–110)
POCT GLUCOSE: 252 MG/DL (ref 70–110)
POTASSIUM SERPL-SCNC: 4.2 MMOL/L (ref 3.5–5.1)
RBC # BLD AUTO: 3.83 X10(6)/MCL (ref 4–5.1)
SODIUM SERPL-SCNC: 136 MMOL/L (ref 135–145)
WBC # SPEC AUTO: 9.71 X10(3)/MCL (ref 4–11.5)

## 2023-08-23 PROCEDURE — 36415 COLL VENOUS BLD VENIPUNCTURE: CPT | Performed by: FAMILY MEDICINE

## 2023-08-23 PROCEDURE — 25000003 PHARM REV CODE 250: Performed by: FAMILY MEDICINE

## 2023-08-23 PROCEDURE — 80048 BASIC METABOLIC PNL TOTAL CA: CPT | Performed by: FAMILY MEDICINE

## 2023-08-23 PROCEDURE — 11000001 HC ACUTE MED/SURG PRIVATE ROOM

## 2023-08-23 PROCEDURE — 94761 N-INVAS EAR/PLS OXIMETRY MLT: CPT

## 2023-08-23 PROCEDURE — 63700000 PHARM REV CODE 250 ALT 637 W/O HCPCS: Performed by: FAMILY MEDICINE

## 2023-08-23 PROCEDURE — 97530 THERAPEUTIC ACTIVITIES: CPT

## 2023-08-23 PROCEDURE — 63600175 PHARM REV CODE 636 W HCPCS: Performed by: FAMILY MEDICINE

## 2023-08-23 PROCEDURE — 97110 THERAPEUTIC EXERCISES: CPT

## 2023-08-23 PROCEDURE — 97116 GAIT TRAINING THERAPY: CPT

## 2023-08-23 PROCEDURE — 85025 COMPLETE CBC W/AUTO DIFF WBC: CPT | Performed by: FAMILY MEDICINE

## 2023-08-23 RX ORDER — FLUCONAZOLE 100 MG/1
100 TABLET ORAL DAILY
Status: DISCONTINUED | OUTPATIENT
Start: 2023-08-23 | End: 2023-08-24 | Stop reason: HOSPADM

## 2023-08-23 RX ORDER — MUPIROCIN 20 MG/G
OINTMENT TOPICAL 2 TIMES DAILY
Status: DISCONTINUED | OUTPATIENT
Start: 2023-08-23 | End: 2023-08-24 | Stop reason: HOSPADM

## 2023-08-23 RX ORDER — ENOXAPARIN SODIUM 100 MG/ML
40 INJECTION SUBCUTANEOUS EVERY 24 HOURS
Status: DISCONTINUED | OUTPATIENT
Start: 2023-08-23 | End: 2023-08-24 | Stop reason: HOSPADM

## 2023-08-23 RX ADMIN — AMLODIPINE BESYLATE 5 MG: 5 TABLET ORAL at 08:08

## 2023-08-23 RX ADMIN — MUPIROCIN: 20 OINTMENT TOPICAL at 09:08

## 2023-08-23 RX ADMIN — CEFTRIAXONE SODIUM 1 G: 1 INJECTION, POWDER, FOR SOLUTION INTRAMUSCULAR; INTRAVENOUS at 05:08

## 2023-08-23 RX ADMIN — ENOXAPARIN SODIUM 40 MG: 40 INJECTION SUBCUTANEOUS at 04:08

## 2023-08-23 RX ADMIN — LEVOTHYROXINE SODIUM 75 MCG: 0.07 TABLET ORAL at 06:08

## 2023-08-23 RX ADMIN — MAGNESIUM OXIDE TAB 400 MG (241.3 MG ELEMENTAL MG) 400 MG: 400 (241.3 MG) TAB at 08:08

## 2023-08-23 RX ADMIN — INSULIN ASPART 3 UNITS: 100 INJECTION, SOLUTION INTRAVENOUS; SUBCUTANEOUS at 11:08

## 2023-08-23 RX ADMIN — FLUCONAZOLE 100 MG: 100 TABLET ORAL at 02:08

## 2023-08-23 RX ADMIN — MIRTAZAPINE 15 MG: 15 TABLET, FILM COATED ORAL at 09:08

## 2023-08-23 RX ADMIN — LISINOPRIL 20 MG: 10 TABLET ORAL at 08:08

## 2023-08-23 RX ADMIN — SODIUM CHLORIDE: 9 INJECTION, SOLUTION INTRAVENOUS at 07:08

## 2023-08-23 RX ADMIN — PANTOPRAZOLE SODIUM 40 MG: 40 TABLET, DELAYED RELEASE ORAL at 08:08

## 2023-08-23 RX ADMIN — SERTRALINE HYDROCHLORIDE 100 MG: 50 TABLET ORAL at 08:08

## 2023-08-23 RX ADMIN — CLOPIDOGREL BISULFATE 75 MG: 75 TABLET ORAL at 08:08

## 2023-08-23 RX ADMIN — TRAZODONE HYDROCHLORIDE 50 MG: 50 TABLET ORAL at 09:08

## 2023-08-23 RX ADMIN — CEFTRIAXONE SODIUM 1 G: 1 INJECTION, POWDER, FOR SOLUTION INTRAMUSCULAR; INTRAVENOUS at 06:08

## 2023-08-23 RX ADMIN — SODIUM CHLORIDE: 9 INJECTION, SOLUTION INTRAVENOUS at 08:08

## 2023-08-23 NOTE — PT/OT/SLP PROGRESS
Physical Therapy Treatment    Patient Name:  Mariajose Allen   MRN:  [id number]    Recommendations:     Discharge Recommendations: home with home health, home health PT, nursing facility, skilled  Discharge Equipment Recommendations: none  Barriers to discharge: None    Assessment:     Mariajose Allen is a 89 y.o. female admitted with a medical diagnosis of <principal problem not specified>.  She presents with the following impairments/functional limitations: weakness, impaired endurance, impaired functional mobility, gait instability, impaired balance, decreased lower extremity function, decreased safety awareness     Patient been sitting up in chair since 8 this morning, requesting to go back to bed. Patient tolerated 30 feet with RW with min A to rise from chair. Patient returned to bed with min/mod A .    Rehab Prognosis: Good and Fair; patient would benefit from acute skilled PT services to address these deficits and reach maximum level of function.    Recent Surgery: * No surgery found *      Plan:     During this hospitalization, patient to be seen 5 x/week (5-6x weekly/1-2x daily) to address the identified rehab impairments via gait training, therapeutic activities, therapeutic exercises and progress toward the following goals:    Plan of Care Expires:  09/21/23    Subjective     Chief Complaint: weakness  Patient/Family Comments/goals: to get stronger  Pain/Comfort:         Objective:     Communicated with nursing prior to session.  Patient found up in chair with peripheral IV upon PT entry to room.     General Precautions: Standard, fall  Orthopedic Precautions:    Braces: N/A  Respiratory Status: Room air     Functional Mobility:  Bed Mobility:     Sit to Supine: minimum assistance and moderate assistance  Transfers:     Sit to Stand:  minimum assistance and moderate assistance with rolling walker  Gait: 30 feet with RW with min A       AM-PAC 6 CLICK MOBILITY          Treatment & Education:  See above. Call bell  and safety education    Patient left HOB elevated with all lines intact, call button in reach, bed alarm on, and family present..    GOALS:   Multidisciplinary Problems       Physical Therapy Goals          Problem: Physical Therapy    Goal Priority Disciplines Outcome Goal Variances Interventions   Physical Therapy Goal     PT, PT/OT Ongoing, Progressing     Description: Goals to be met by: discharge     Patient will increase functional independence with mobility by performin. Supine to sit with Contact Guard Assistance  2. Sit to stand transfer with Contact Guard Assistance  3. Gait  x 100 feet with Contact Guard Assistance using Rolling Walker.                          Time Tracking:     PT Received On: 23  PT Start Time: 930     PT Stop Time: 1000  PT Total Time (min): 30 min     Billable Minutes: Gait Training 15 and Therapeutic Activity 15    Treatment Type: Treatment  PT/PTA: PT           2023

## 2023-08-23 NOTE — PLAN OF CARE
Problem: Adult Inpatient Plan of Care  Goal: Plan of Care Review  Outcome: Ongoing, Progressing  Goal: Patient-Specific Goal (Individualized)  Outcome: Ongoing, Progressing  Goal: Absence of Hospital-Acquired Illness or Injury  Outcome: Ongoing, Progressing  Goal: Optimal Comfort and Wellbeing  Outcome: Ongoing, Progressing  Goal: Readiness for Transition of Care  Outcome: Ongoing, Progressing     Problem: Diabetes Comorbidity  Goal: Blood Glucose Level Within Targeted Range  Outcome: Ongoing, Progressing     Problem: Fall Injury Risk  Goal: Absence of Fall and Fall-Related Injury  Outcome: Ongoing, Progressing     Problem: Impaired Wound Healing  Goal: Optimal Wound Healing  Outcome: Ongoing, Progressing     Problem: Skin Injury Risk Increased  Goal: Skin Health and Integrity  Outcome: Ongoing, Progressing     Problem: Infection  Goal: Absence of Infection Signs and Symptoms  Outcome: Ongoing, Progressing

## 2023-08-23 NOTE — PT/OT/SLP PROGRESS
Physical Therapy Treatment    Patient Name:  Mariajose Allen   MRN:  [id number]    Recommendations:     Discharge Recommendations: home with home health, home health PT, nursing facility, skilled  Discharge Equipment Recommendations: none  Barriers to discharge: None    Assessment:     Mariajose Allen is a 89 y.o. female admitted with a medical diagnosis of <principal problem not specified>.  She presents with the following impairments/functional limitations: weakness, impaired endurance, impaired functional mobility, gait instability, impaired balance, decreased lower extremity function, decreased safety awareness     Patient tolerated up to toilet to be cleaned with multiple sit to stands to be cleaned. Then returned to bed. .    Rehab Prognosis: Good and Fair; patient would benefit from acute skilled PT services to address these deficits and reach maximum level of function.    Recent Surgery: * No surgery found *      Plan:     During this hospitalization, patient to be seen 5 x/week (5-6x weekly/1-2x daily) to address the identified rehab impairments via gait training, therapeutic activities, therapeutic exercises and progress toward the following goals:    Plan of Care Expires:  09/21/23    Subjective     Chief Complaint: weakness  Patient/Family Comments/goals: to get stronger  Pain/Comfort:         Objective:     Communicated with nursing prior to session.  Patient found up in chair with peripheral IV upon PT entry to room.     General Precautions: Standard, fall  Orthopedic Precautions:    Braces: N/A  Respiratory Status: Room air     Functional Mobility:  Transfers:     Sit to Stand:  minimum assistance and moderate assistance with rolling walker  Toilet Transfer: minimum assistance and moderate assistance with  rolling walker  using  Step Transfer  Gait: 5 steps to toilet with RW with min.mod A       AM-PAC 6 CLICK MOBILITY          Treatment & Education:  See above    Patient left HOB elevated with all lines  intact, call button in reach, and bed alarm on..    GOALS:   Multidisciplinary Problems       Physical Therapy Goals          Problem: Physical Therapy    Goal Priority Disciplines Outcome Goal Variances Interventions   Physical Therapy Goal     PT, PT/OT Ongoing, Progressing     Description: Goals to be met by: discharge     Patient will increase functional independence with mobility by performin. Supine to sit with Contact Guard Assistance  2. Sit to stand transfer with Contact Guard Assistance  3. Gait  x 100 feet with Contact Guard Assistance using Rolling Walker.                          Time Tracking:     PT Received On: 23  PT Start Time: 1500     PT Stop Time: 1515  PT Total Time (min): 15 min     Billable Minutes: Therapeutic Activity 15    Treatment Type: Treatment  PT/PTA: PT           2023

## 2023-08-23 NOTE — PROGRESS NOTES
Hospital Medicine  Progress Note    Patient Name: Mariajose Allen  MRN: [id number]  Status: IP- Inpatient   Admission Date: 8/21/2023  Length of Stay: 2  Date of Service: 08/23/2023       CC: hospital follow-up for        SUBJECTIVE     89 y.o. female with hx of HTN, hx of CVA, Hypothyroidism, GERD, possible hx of Dementia who presented with nausea and decreased appetite.  Hard to quantify how long, patient says nausea has been present for a while but did not narrow down a time frame.  She denies vomiting just the nausea.  No fevers or chills.  Her son reports a loose/soft BM this am that the patient described a dark with a possible trace of blood.  She denies CP or SOB.  No recent sick contacts.  She denies abdominal pain when asked, however on exam seems to have some tenderness in lower and left lower abdomen.  No report of any urinary symptoms.     08/22/2023  More awake and alert this AM  Lab improve  Renal fxn improving  Want to try and eat something     08/23/2023   Urine culture gram neg ashley > 100,000    Today: Patient seen and examined at bedside, and chart reviewed.       MEDICATIONS   Scheduled   amLODIPine  5 mg Oral Daily    cefTRIAXone (ROCEPHIN) IVPB  1 g Intravenous Q12H    clopidogreL  75 mg Oral Daily    enoxparin  40 mg Subcutaneous Daily    fluconazole  100 mg Oral Daily    levothyroxine  75 mcg Oral Before breakfast    lisinopriL  20 mg Oral Daily    magnesium oxide  400 mg Oral Daily    mirtazapine  15 mg Oral QHS    mupirocin   Nasal BID    pantoprazole  40 mg Oral Daily    sertraline  100 mg Oral Daily    traZODone  50 mg Oral QHS     Continuous Infusions   sodium chloride 0.9% 100 mL/hr at 08/23/23 0826         PHYSICAL EXAM   VITALS: T 97.5 °F (36.4 °C)   BP (!) 177/46   P 69   RR 20   O2 97 %    GENERAL: Awake and in NAD  LUNGS: CTA B/L  CVS: Normal rate  GI/: Soft, NT, bowel sounds positive.  EXTREMITIES: No peripheral edema  NEURO: AAOx3  PSYCH: Cooperative      LABS   CBC  Recent Labs      08/22/23  0504 08/23/23  0508   WBC 9.64 9.71   RBC 3.84* 3.83*   HGB 10.9* 11.0*   HCT 34.8* 34.6*   MCV 90.6 90.3   MCH 28.4 28.7   MCHC 31.3 31.8   RDW 13.7 13.8    149     CHEM  Recent Labs     08/21/23  1517 08/22/23  0504 08/23/23  0508    137 136   K 5.1 4.5 4.2   CHLORIDE 103 110 109   CO2 25 21 22   BUN 44.0* 38.0* 23.0*   CREATININE 2.06* 1.60* 1.32*   GLUCOSE 192* 61* 135*   CALCIUM 9.4 8.4 8.1*   ALBUMIN 3.9 2.9*  --    GLOBULIN 3.3 2.7  --    ALKPHOS 74 59  --    ALT 34 26  --    AST 97* 109*  --    BILITOT 0.5 0.3  --    LIPASE 41  --   --          MICROBIOLOGY     Microbiology Results (last 7 days)       Procedure Component Value Units Date/Time    Urine culture [[id number]]  (Abnormal) Collected: 08/21/23 1643    Order Status: Completed Specimen: Urine, Catheterized Updated: 08/23/23 0708     Urine Culture >/= 100,000 colonies/ml Gram-negative Rods              DIAGNOSTICS   X-Ray Chest AP Portable  Narrative: EXAMINATION:  STUDY: XR CHEST AP PORTABLE    CLINICAL HISTORY AND TECHNIQUE:  Arvind Dailey, RT on 8/21/2023  3:43 PM    CLINICAL HX: ER PT    X 1 WEEK    C/O GENERALZIED WEAKNESS, LOSS OF APPETITE, NAUSEA    PAST MEDICAL HX: CV (+)    TECHNIQUE: 1V PORTABLE CHEST    TECH: AKG/DB/NN    PT TRANSPORTED WO INCIDENT    COMPARISON:  None    FINDINGS:  The lungs are slightly under expanded.The cardiac, hilar, and mediastinal contours appear unremarkable.I see no lobar or segmental infiltrates.No significant pleural effusions are noted.There is moderate demineralization of the skeletal structures with moderate degenerative changes noted throughout the thoracic spine.  Atherosclerotic calcifications are noted within the aortic arch.  Impression: 1. I see no lobar or segmental infiltrates or other significant abnormalities.See above comments.    Electronically signed by: Gonzalez Baibn  Date:    08/21/2023  Time:    15:51  CT Abdomen Pelvis  Without Contrast  Narrative: EXAMINATION:  CT ABDOMEN  PELVIS WITHOUT CONTRAST    CLINICAL HISTORY AND TECHNIQUE:  Anamaria Lawson, RT on 8/21/2023  3:33 PM    PT STATUS: ER    PROCEDURE: CT ABD/PELVIS WO    CLINICAL HX : X'S 1 WEEK, WEAKNESS, BLOOD IN STOOL & NAUSEA    PMH: CVA    IV CONTRAST: NONE    ORAL CONTRAST: NONE    RECTAL CONTRAST: NONE    AXIAL IMAGES @ 5MM INTERVALS WITH MULTIPLANAR RECONSTRUCTION    TOTAL IMAGE NUMBER: 153    NUMBER OF CT SCANS IN PAST 12 MONTHS: 1    CTDIvol(mGy): HEAD:     BODY: 6.70    DLP(mGycm): HEAD:     BODY:  364.20    TECH: AKG/DB    PT TRANSPORTED W/O INCIDENT    This patient has had 1 CT and nuclear medicine scans performed within the last 12 months.    The following DOSE REDUCTION TECHNIQUES are used for all CT scans at [facility]:    1. Automated exposure control.  2. Adjustment of the mA and/or kv according to patient size.  3. Use of iterative reconstruction technique.    COMPARISON:  08/28/2020    FINDINGS:  Liver: No clinically significant abnormalities are noted.    Gallbladder/biliary system: Previous cholecystectomy.    Spleen: Vascular calcifications are noted within the splenic artery and splenic hilum.  Coarse parenchymal calcifications are noted within the splenic parenchyma as can be seen with old granulomatous disease.    Adrenal glands: No clinically significant abnormalities are noted.    Pancreas: The pancreas is moderately atrophic with no worrisome inflammatory changes or focal masses appreciated.    Kidneys/ureters: There is moderate atrophy of both kidneys with no worrisome focal parenchymal abnormalities appreciated.  I see no obvious ureteral stones or changes to suggest ureteral obstruction.    Urinary bladder: The urinary bladder is poorly distended and difficult to evaluate with no definite abnormalities noted.    Uterus and ovaries: The patient is post hysterectomy.    GI tract: Unopacified loops of large and small bowel as well as the gastric lumen and appendix are difficult to  evaluate with no worrisome abnormalities noted.    Vascular structures: Moderate atherosclerotic plaquing is noted throughout the abdominal aorta is primary branches.    Musculoskeletal structures: There is significant demineralization of the skeletal structures with a moderate, levoconvex, scoliotic curvature of the lumbar spine and fairly prominent degenerative changes noted involving the lower thoracic and lumbar spine.    Miscellaneous: Electronic stimulator is noted within the subcutaneous fat of the right flank with the distal tip of the lead located within the region of the left sacral wing.  Impression: 1. Chronic changes are present as described above and as seen previously.  See above comments.    Electronically signed by: Gonzalez Babin  Date:    08/21/2023  Time:    15:38        ASSESSMENT/PLAN:   Problem: Non-traumatic Rhabdomyolysis  Differential Dx: NA  Chronic issues affecting care: age, intake   Radiology reports reviewed and/or personal interpretation: report reviewed as above   Tests considered or ordered: labs  Plan of care: IVFs and monitor UOP.  Trend CK levels.   Cont ivfs  CK up to 2201      Problem: UTI  > 100,000 gram negative rods  Cont rocephin iv     Problem: Acute on chronic renal failure   Differential DX: Obstructive vs intrinsic process vs decreased PO intake   Chronic issues affecting care: age  Radiology reports reviewed and/or personal interpretation: as above   Tests considered or ordered: labs  Plan of care: last month Cr level 1.4 so above baseline.  IVFs and follow labs.   Improved BUN Cr down to   23 & 1.32        Problem: Left Lower Abdominal pain   Differential DX: obstruction vs enteritis   Chronic issues affecting care: age  Radiology reports reviewed and/or personal interpretation: CT report as above   Tests considered or ordered: repeat imaging or Xrays  Plan of care: Monitor nausea and PO intake, non contrasted CT may still be an issue.  After proper hydration can  consider Oral contrasted CT if no resolution.  Plain films also can monitor for SBO.   -improved     Problem: Nausea   Differential DX: obstruction vs infection   Chronic issues affecting care: age  Radiology reports reviewed and/or personal interpretation: as above   Tests considered or ordered: imaging, labs   Plan of care: Monitor PO intake, treat with meds prn.  May just be affect of dehydration and renal failure. See how she responds after hydration.   -improved            Chronic medical issues addressed during this admission and plan of care:  Hypothyroidism - resume meds  Hx of CVA - likely can come off plavix and use just ASA  HTN - resume and adjust home meds as needed according to BP and renal function     Hospice eval today for at home discharge planning             Dalton Thomas MD  [facility]

## 2023-08-23 NOTE — PT/OT/SLP PROGRESS
Physical Therapy Treatment    Patient Name:  Mariajose Allen   MRN:  [id number]    Recommendations:     Discharge Recommendations: home with home health, home health PT, nursing facility, skilled  Discharge Equipment Recommendations: none  Barriers to discharge: None    Assessment:     Mariajose Allen is a 89 y.o. female admitted with a medical diagnosis of <principal problem not specified>.  She presents with the following impairments/functional limitations: weakness, impaired endurance, impaired functional mobility, gait instability, impaired balance, decreased lower extremity function, decreased safety awareness     Patient tolerated 40 feet with RW with min A and then returned to chair. Patient performed BLE there while sitting up in chair.     Rehab Prognosis: Good and Fair; patient would benefit from acute skilled PT services to address these deficits and reach maximum level of function.    Recent Surgery: * No surgery found *      Plan:     During this hospitalization, patient to be seen 5 x/week (5-6x weekly/1-2x daily) to address the identified rehab impairments via gait training, therapeutic activities, therapeutic exercises and progress toward the following goals:    Plan of Care Expires:  09/21/23    Subjective     Chief Complaint: weakness  Patient/Family Comments/goals: to get stronger  Pain/Comfort:         Objective:     Communicated with nursing prior to session.  Patient found HOB elevated with peripheral IV upon PT entry to room.     General Precautions: Standard, fall  Orthopedic Precautions:    Braces: N/A  Respiratory Status: Room air     Functional Mobility:  Bed Mobility:     Sit to Supine: minimum assistance  Transfers:     Sit to Stand:  minimum assistance with rolling walker  Gait: 40 feet with RW with min A       AM-PAC 6 CLICK MOBILITY          Treatment & Education:  See above. Call bell and safety education    Patient left up in chair with all lines intact, call button in reach, and chair  alarm on..    GOALS:   Multidisciplinary Problems       Physical Therapy Goals          Problem: Physical Therapy    Goal Priority Disciplines Outcome Goal Variances Interventions   Physical Therapy Goal     PT, PT/OT Ongoing, Progressing     Description: Goals to be met by: discharge     Patient will increase functional independence with mobility by performin. Supine to sit with Contact Guard Assistance  2. Sit to stand transfer with Contact Guard Assistance  3. Gait  x 100 feet with Contact Guard Assistance using Rolling Walker.                          Time Tracking:     PT Received On: 23  PT Start Time: 1400     PT Stop Time: 1430  PT Total Time (min): 30 min     Billable Minutes: Gait Training 15 and Therapeutic Exercise 15    Treatment Type: Treatment  PT/PTA: PT           2023

## 2023-08-23 NOTE — PROGRESS NOTES
Inpatient Nutrition Evaluation    Admit Date: 8/21/2023   Total duration of encounter: 2 days    Nutrition Recommendation/Prescription     Add NCS to Ground Low Na Diet.     Add Novasource 1x/day (475 kcal, 22 gm pro).    Continue to encourage PO intake and assist with feeds as needed.        RD to monitor patients Electrolytes, Labs, PO Intake, and Weight  and adjust MNT as needed.     Nutrition Assessment     Chart Review    Reason Seen: continuous nutrition monitoring and malnutrition screening tool (MST)    Malnutrition Screening Tool Results   Have you recently lost weight without trying?: Yes: Unsure how much  Have you been eating poorly because of a decreased appetite?: Yes   MST Score: 3     Diagnosis:  Non-traumatic Rhabdomyolysis, Acute on Chronic Renal Failure, Left Lower Abdominal Pain, Nausea.     Relevant Medical History: Anxiety, CHF, CKD, Dementia, DM, Dysphagia, GERD, HLD, HTN, Stroke, COVID, Hemiparesis, and SBO    Nutrition-Related Medications: Rocephin, Mag-ox, IVF @ 100 ml/hr    Nutrition-Related Labs:    Latest Reference Range & Units 08/23/23 05:08   RBC 4.00 - 5.10 x10(6)/mcL 3.83 (L)   Hemoglobin 11.8 - 16.0 g/dL 11.0 (L)   Hematocrit 36.0 - 48.0 % 34.6 (L)   BUN 7.0 - 20.0 mg/dL 23.0 (H)   Creatinine 0.66 - 1.25 mg/dL 1.32 (H)   Glucose 70 - 115 mg/dL 135 (H)   Calcium 8.4 - 10.2 mg/dL 8.1 (L)     (8/23):  CrCl- 33.4    Diet Order: Diet Dysphagia Mechanical Ground (IDDSI Level 5) Low Sodium, No Concentrated Sweets  Oral Supplement Order: none  Appetite/Oral Intake: fair/25-50% of meals 42%-3 Meals  Factors Affecting Nutritional Intake: abdominal pain, chewing difficulty, and decreased appetite  Food/Orthodox/Cultural Preferences: none reported  Food Allergies: none reported and no known food allergies    Skin Integrity: bruised (ecchymotic) (FRAGILE/THIN)  Wound(s):      Altered Skin Integrity 08/21/23 1730 upper Labia #7 Moisture associated dermatitis-Tissue loss description: Partial  "thickness     Comments    (8/23): Patient reports appetite has been poor for a while but unsure of time frame, currently appetite is good. Patient unsure of weight change but UBW ~160#. Per CNA CBW~169.6# showing previous weight of 199# likely bed scale error. Nurse reports that patient is to discharge home tomorrow on hospice. RD to continue following and make changes as needed.     Anthropometrics    Height: 5' 7" (170.2 cm) Height Method: Stated  Last Weight: 77.1 kg (169 lb 14.4 oz) (Per CNA. Mrs. Hall) (08/23/23 1638) Weight Method: Standard Scale  BMI (Calculated): 26.6  BMI Classification: overweight (BMI 25-29.9)     Ideal Body Weight (IBW), Female: 135 lb     % Ideal Body Weight, Female (lb): 119.26 %                             Usual Weight Provided By: patient and EMR weight history    Wt Readings from Last 5 Encounters:   08/23/23 77.1 kg (169 lb 14.4 oz)   08/21/23 72.1 kg (159 lb)   07/18/23 71.2 kg (157 lb)   04/04/23 75.8 kg (167 lb 3.2 oz)   09/15/21 77.5 kg (170 lb 13.7 oz)     Weight Change(s) Since Admission:  Admit Weight: 73 kg (160 lb 14.4 oz) (08/21/23 1439)      Patient Education    Not applicable.         Monitoring & Evaluation     Dietitian will monitor Food and Beverage Intake, Energy Intake, Weight, Weight Change, Electrolyte/Renal panel, Glucose/Endocrine Profile, and Wound Healing.  Nutrition Risk/Follow-Up: Low (follow-up in 5-7 days)  Patients assigned 'low nutrition risk' status do not qualify for a full nutritional assessment but will be monitored and re-evaluated in a 5-7 day time period. Please consult if re-evaluation needed sooner.    "

## 2023-08-24 VITALS
WEIGHT: 174.38 LBS | TEMPERATURE: 97 F | HEIGHT: 67 IN | BODY MASS INDEX: 27.37 KG/M2 | HEART RATE: 68 BPM | RESPIRATION RATE: 18 BRPM | OXYGEN SATURATION: 96 % | DIASTOLIC BLOOD PRESSURE: 55 MMHG | SYSTOLIC BLOOD PRESSURE: 148 MMHG

## 2023-08-24 PROBLEM — M62.82 NON-TRAUMATIC RHABDOMYOLYSIS: Status: ACTIVE | Noted: 2023-08-24

## 2023-08-24 LAB
ANION GAP SERPL CALC-SCNC: 4 MEQ/L (ref 2–13)
BACTERIA UR CULT: ABNORMAL
BASOPHILS # BLD AUTO: 0.04 X10(3)/MCL (ref 0.01–0.08)
BASOPHILS NFR BLD AUTO: 0.5 % (ref 0.1–1.2)
BUN SERPL-MCNC: 18 MG/DL (ref 7–20)
CALCIUM SERPL-MCNC: 8 MG/DL (ref 8.4–10.2)
CHLORIDE SERPL-SCNC: 109 MMOL/L (ref 98–110)
CO2 SERPL-SCNC: 24 MMOL/L (ref 21–32)
CREAT SERPL-MCNC: 1.12 MG/DL (ref 0.66–1.25)
CREAT/UREA NIT SERPL: 16 (ref 12–20)
EOSINOPHIL # BLD AUTO: 0.38 X10(3)/MCL (ref 0.04–0.36)
EOSINOPHIL NFR BLD AUTO: 4.8 % (ref 0.7–7)
ERYTHROCYTE [DISTWIDTH] IN BLOOD BY AUTOMATED COUNT: 13.8 % (ref 11–14.5)
GFR SERPLBLD CREATININE-BSD FMLA CKD-EPI: 47 MLS/MIN/1.73/M2
GLUCOSE SERPL-MCNC: 169 MG/DL (ref 70–115)
HCT VFR BLD AUTO: 30.7 % (ref 36–48)
HGB BLD-MCNC: 9.8 G/DL (ref 11.8–16)
IMM GRANULOCYTES # BLD AUTO: 0.05 X10(3)/MCL (ref 0–0.03)
IMM GRANULOCYTES NFR BLD AUTO: 0.6 % (ref 0–0.5)
LYMPHOCYTES # BLD AUTO: 1.93 X10(3)/MCL (ref 1.16–3.74)
LYMPHOCYTES NFR BLD AUTO: 24.6 % (ref 20–55)
MCH RBC QN AUTO: 29.1 PG (ref 27–34)
MCHC RBC AUTO-ENTMCNC: 31.9 G/DL (ref 31–37)
MCV RBC AUTO: 91.1 FL (ref 79–99)
MONOCYTES # BLD AUTO: 0.63 X10(3)/MCL (ref 0.24–0.36)
MONOCYTES NFR BLD AUTO: 8 % (ref 4.7–12.5)
NEUTROPHILS # BLD AUTO: 4.81 X10(3)/MCL (ref 1.56–6.13)
NEUTROPHILS NFR BLD AUTO: 61.5 % (ref 37–73)
NRBC BLD AUTO-RTO: 0 %
PLATELET # BLD AUTO: 149 X10(3)/MCL (ref 140–371)
PMV BLD AUTO: 10.5 FL (ref 9.4–12.4)
POCT GLUCOSE: 177 MG/DL (ref 70–110)
POCT GLUCOSE: 205 MG/DL (ref 70–110)
POTASSIUM SERPL-SCNC: 4.7 MMOL/L (ref 3.5–5.1)
RBC # BLD AUTO: 3.37 X10(6)/MCL (ref 4–5.1)
SODIUM SERPL-SCNC: 137 MMOL/L (ref 135–145)
WBC # SPEC AUTO: 7.84 X10(3)/MCL (ref 4–11.5)

## 2023-08-24 PROCEDURE — 94761 N-INVAS EAR/PLS OXIMETRY MLT: CPT

## 2023-08-24 PROCEDURE — 97530 THERAPEUTIC ACTIVITIES: CPT

## 2023-08-24 PROCEDURE — 97110 THERAPEUTIC EXERCISES: CPT

## 2023-08-24 PROCEDURE — 25000003 PHARM REV CODE 250: Performed by: FAMILY MEDICINE

## 2023-08-24 PROCEDURE — 36415 COLL VENOUS BLD VENIPUNCTURE: CPT | Performed by: FAMILY MEDICINE

## 2023-08-24 PROCEDURE — 63600175 PHARM REV CODE 636 W HCPCS: Performed by: FAMILY MEDICINE

## 2023-08-24 PROCEDURE — 85025 COMPLETE CBC W/AUTO DIFF WBC: CPT | Performed by: FAMILY MEDICINE

## 2023-08-24 PROCEDURE — 63700000 PHARM REV CODE 250 ALT 637 W/O HCPCS: Performed by: FAMILY MEDICINE

## 2023-08-24 PROCEDURE — 80048 BASIC METABOLIC PNL TOTAL CA: CPT | Performed by: FAMILY MEDICINE

## 2023-08-24 RX ORDER — CIPROFLOXACIN 500 MG/1
500 TABLET ORAL EVERY 12 HOURS
Qty: 14 TABLET | Refills: 0 | Status: SHIPPED | OUTPATIENT
Start: 2023-08-24 | End: 2023-08-31

## 2023-08-24 RX ORDER — FLUCONAZOLE 100 MG/1
100 TABLET ORAL DAILY
Qty: 5 TABLET | Refills: 0 | Status: SHIPPED | OUTPATIENT
Start: 2023-08-25 | End: 2023-08-30

## 2023-08-24 RX ADMIN — AMLODIPINE BESYLATE 5 MG: 5 TABLET ORAL at 08:08

## 2023-08-24 RX ADMIN — MAGNESIUM OXIDE TAB 400 MG (241.3 MG ELEMENTAL MG) 400 MG: 400 (241.3 MG) TAB at 08:08

## 2023-08-24 RX ADMIN — LISINOPRIL 20 MG: 10 TABLET ORAL at 08:08

## 2023-08-24 RX ADMIN — MUPIROCIN: 20 OINTMENT TOPICAL at 08:08

## 2023-08-24 RX ADMIN — CEFTRIAXONE SODIUM 1 G: 1 INJECTION, POWDER, FOR SOLUTION INTRAMUSCULAR; INTRAVENOUS at 05:08

## 2023-08-24 RX ADMIN — INSULIN ASPART 2 UNITS: 100 INJECTION, SOLUTION INTRAVENOUS; SUBCUTANEOUS at 11:08

## 2023-08-24 RX ADMIN — CLOPIDOGREL BISULFATE 75 MG: 75 TABLET ORAL at 08:08

## 2023-08-24 RX ADMIN — LEVOTHYROXINE SODIUM 75 MCG: 0.07 TABLET ORAL at 05:08

## 2023-08-24 RX ADMIN — PANTOPRAZOLE SODIUM 40 MG: 40 TABLET, DELAYED RELEASE ORAL at 08:08

## 2023-08-24 RX ADMIN — SERTRALINE HYDROCHLORIDE 100 MG: 50 TABLET ORAL at 08:08

## 2023-08-24 RX ADMIN — FLUCONAZOLE 100 MG: 100 TABLET ORAL at 08:08

## 2023-08-24 NOTE — PLAN OF CARE
Problem: Physical Therapy  Goal: Physical Therapy Goal  Description: Goals to be met by: discharge     Patient will increase functional independence with mobility by performin. Supine to sit with Contact Guard Assistance  2. Sit to stand transfer with Contact Guard Assistance  3. Gait  x 100 feet with Contact Guard Assistance using Rolling Walker.     Outcome: Unable to Meet, Plan Revised, Discharge home on Hospice

## 2023-08-24 NOTE — PLAN OF CARE
[facility] nurse reports that pt/fly agreed to hospice services. Patient to be discharged tomorrow.

## 2023-08-24 NOTE — PHYSICIAN QUERY
PT Name: Mairajose Allen  MR #: [id number]     DOCUMENTATION CLARIFICATION     CDS/: Kim Reeves RN          Contact information: [email or web address]   This form is a permanent document in the medical record.     Query Date: August 24, 2023    By submitting this query, we are merely seeking further clarification of documentation.  Please utilize your independent clinical judgment when addressing the question(s) below.    The Medical Record contains the following:   Indicators   Supporting Clinical Findings Location in Medical Record    Non-blanchable erythema/redness      Ulcer/Injury/Skin Breakdown      Deep Tissue Injury     x Wound care consult Altered Skin Integrity 08/21/23 1730 Left anterior Abdomen #1 Moisture associated dermatitis     Wound Image         Altered Skin Integrity 08/21/23 1730 upper Labia #7 Moisture associated dermatitis     Wound Image       Wound care note of 8/22                                                  Wound care note of 8/22                                               x Acute/Chronic Illness Acute Illness: non traumatic rhabdomyolysis, acute on chronic renal failure, left lwoer abdominal pain, nausea, Hypothyroidism, Hx of CVA, HTN     HTN, hx of CVA, Hypothyroidism, GERD, possible hx of Dementia who presented with nausea and decreased appetite         H & P of 8/21      H & P of 8/21    x Medication/Treatment Applied:  (antifungal cream) to abdomen      Cleansed with:  (bathing wipes then applied barrier cream) to labia          Wound care note of 8/22        Wound care note of 8/22     Other       The clinical guidelines noted are only a system guideline. It does not replace the providers clinical judgment.    Per the National Pressure Injury Advisory Panel:   A pressure injury is localized damage to the skin and underlying soft tissue usually over a bony prominence or related to a medical or other device. The injury can present as intact skin or an open ulcer  and may be painful. The injury occurs as a result of intense and/or prolonged pressure or pressure in combination with shear. The tolerance of soft tissue for pressure and shear may also be affected by microclimate, nutrition, perfusion, co-morbidities and condition of the soft tissue.       Stage 1 Pressure Injury:  Intact skin with a localized area of non-blanchable erythema, which may appear differently in darkly pigmented skin. Color changes do not include purple or maroon discoloration; these may indicate deep tissue pressure injury.    Stage 2 Pressure Injury:  Partial-thickness loss of skin with exposed dermis. The wound bed is viable, pink or red, moist, and may also present as an intact or ruptured serum-filled blister.    Stage 3 Pressure Injury:  Full-thickness loss of skin, in which adipose (fat) is visible in the ulcer and granulation tissue and epibole (rolled wound edges) are often present. Slough and/or eschar may be visible. Undermining and tunneling may occur.    Stage 4 Pressure Injury:  Full-thickness skin and tissue loss with exposed or directly palpable fascia, muscle, tendon, ligament, cartilage or bone in the ulcer. Slough and/or eschar may be visible. Epibole (rolled edges), undermining and/or tunneling often occur.    Unstageable Pressure Injury:  Full-thickness skin and tissue loss in which the extent of tissue damage within the ulcer cannot be confirmed because it is obscured by slough or eschar. If slough or eschar is removed, a Stage 3 or Stage 4 pressure injury will be revealed.    Deep Tissue Pressure Injury:  Intact or non-intact skin with localized area of persistent non-blanchable deep red, maroon, purple discoloration or epidermal separation revealing a dark wound bed or blood filled blister. This injury results from intense and/or prolonged pressure and shear forces at the bone-muscle interface. The wound may evolve rapidly to reveal the actual extent of tissue injury, or may  resolve without tissue loss. If necrotic tissue, subcutaneous tissue, granulation tissue, fascia, muscle or other underlying structures are visible, this indicates a full thickness pressure injury (Unstageable, Stage 3 or Stage 4). Do not use DTPI to describe vascular, traumatic, neuropathic, or dermatologic conditions.   Medical Device Related Pressure Injury: This describes an etiology. Medical device related pressure injuries result from the use of devices designed and applied for diagnostic or therapeutic purposes. The resultant pressure injury generally conforms to the pattern or shape of the device. The injury should be staged using the staging system.    Mucosal Membrane Pressure Injury: Mucosal membrane pressure injury is found on mucous membranes with a history of a medical device in use at the location of the injury. Due to the anatomy of the tissue these ulcers cannot be staged.       Provider, please provide the integumentary diagnosis related to the documentation of Left anterior abdomen:     [ X  ] Moisture associated dermatitis     [   ] Other Integumentary Diagnosis (please specify):______________     [  ] Clinically Undetermined         Provider, please provide the integumentary diagnosis related to the documentation of Upper Labia :      [   ] Moisture associated dermatitis     [   ] Other Integumentary Diagnosis (please specify):______________     [  ] Clinically Undetermined       Please document in your progress notes daily for the duration of treatment until resolved and include in your discharge summary.    Reference:    ELIAS Posada., Macario, CHRISTIAN. MARILIA., Goldberg, M., KANNAN Christiansen., KANNAN Dutta., & MARILIA Adkins. (2016). Revised National Pressure Ulcer Advisory Panel Pressure Injury Staging System: Revised Pressure Injury Staging System. J Wound Ostomy Continence Nurs, 43(6), 585-597. doi:10.1097/won.9216760913200526    Form No.[id number]

## 2023-08-24 NOTE — PT/OT/SLP DISCHARGE
Physical Therapy Discharge Summary    Name: Mariajose Allen  MRN: [id number]   Principal Problem: Non-traumatic rhabdomyolysis     Patient Discharged from acute Physical Therapy on 23/.  Please refer to prior PT noted date on 23 for functional status.     Assessment:     Patient tolerated up to toilet and BLE therex while up in chair.     Objective:     GOALS:   Multidisciplinary Problems       Physical Therapy Goals          Problem: Physical Therapy    Goal Priority Disciplines Outcome Goal Variances Interventions   Physical Therapy Goal     PT, PT/OT Unable to Meet, Plan Revised     Description: Goals to be met by: discharge     Patient will increase functional independence with mobility by performin. Supine to sit with Contact Guard Assistance  2. Sit to stand transfer with Contact Guard Assistance  3. Gait  x 100 feet with Contact Guard Assistance using Rolling Walker.                          Reasons for Discontinuation of Therapy Services  Transfer to alternate level of care.      Plan:     Patient Discharged to: Palliative Care/Hospice.      2023

## 2023-08-24 NOTE — PLAN OF CARE
Physician ordered to consult hospice. Pt/fly choice ( [facility] ). Referral made to  [facility] per phone/fax, spoke with Celestina Berman. Celestina thomas eval patient today.

## 2023-08-24 NOTE — PLAN OF CARE
Physician ordered to discharge patient home with [facility] services. [facility] was notified of pt's discharge, spoke with Celestina.. Hospice nurse to meet patient at her residence today.

## 2023-08-24 NOTE — PLAN OF CARE
Problem: Adult Inpatient Plan of Care  Goal: Plan of Care Review  Outcome: Met  Goal: Patient-Specific Goal (Individualized)  Outcome: Met  Goal: Absence of Hospital-Acquired Illness or Injury  Outcome: Met  Goal: Optimal Comfort and Wellbeing  Outcome: Met  Goal: Readiness for Transition of Care  Outcome: Met     Problem: Diabetes Comorbidity  Goal: Blood Glucose Level Within Targeted Range  Outcome: Met     Problem: Fall Injury Risk  Goal: Absence of Fall and Fall-Related Injury  Outcome: Met     Problem: Impaired Wound Healing  Goal: Optimal Wound Healing  Outcome: Met     Problem: Skin Injury Risk Increased  Goal: Skin Health and Integrity  Outcome: Met     Problem: Infection  Goal: Absence of Infection Signs and Symptoms  Outcome: Met

## 2023-08-24 NOTE — DISCHARGE SUMMARY
Hospital Medicine  Discharge Summary    Patient Name: Mariajose Allen  MRN: [id number]  Admit Date: 8/21/2023  Discharge Date:    Status: IP- Inpatient   Length of Stay: 3      PHYSICIANS   Admitting Physician: Lisa Camejo MD  Discharging Physician: Dalton Thomas MD.  Primary Care Physician: Mitchel Stewart MD        DISCHARGE DIAGNOSES   Problem: Non-traumatic Rhabdomyolysis  Differential Dx: NA  Chronic issues affecting care: age, intake   Radiology reports reviewed and/or personal interpretation: report reviewed as above   Tests considered or ordered: labs  Plan of care: IVFs and monitor UOP.  Trend CK levels.   Cont ivfs  CK up to 2201       Problem: UTI Proteus mirabalis   > 100,000 gram negative rods  Cont rocephin iv     Problem: Acute on chronic renal failure   Differential DX: Obstructive vs intrinsic process vs decreased PO intake   Chronic issues affecting care: age  Radiology reports reviewed and/or personal interpretation: as above   Tests considered or ordered: labs  Plan of care: last month Cr level 1.4 so above baseline.  IVFs and follow labs.   Improved BUN Cr down to   23 & 1.32        Problem: Left Lower Abdominal pain   Differential DX: obstruction vs enteritis   Chronic issues affecting care: age  Radiology reports reviewed and/or personal interpretation: CT report as above   Tests considered or ordered: repeat imaging or Xrays  Plan of care: Monitor nausea and PO intake, non contrasted CT may still be an issue.  After proper hydration can consider Oral contrasted CT if no resolution.  Plain films also can monitor for SBO.   -improved     Problem: Nausea   Differential DX: obstruction vs infection   Chronic issues affecting care: age  Radiology reports reviewed and/or personal interpretation: as above   Tests considered or ordered: imaging, labs   Plan of care: Monitor PO intake, treat with meds prn.  May just be affect of dehydration and renal failure. See how she responds after  hydration.   -improved            Chronic medical issues addressed during this admission and plan of care:  Hypothyroidism - resume meds  Hx of CVA - likely can come off plavix and use just ASA  HTN - resume and adjust home meds as needed according to BP and renal function      Hospice eval today for at home discharge planning          PROCEDURES       HOSPITAL COURSE    89 y.o. female with hx of HTN, hx of CVA, Hypothyroidism, GERD, possible hx of Dementia who presented with nausea and decreased appetite.  Hard to quantify how long, patient says nausea has been present for a while but did not narrow down a time frame.  She denies vomiting just the nausea.  No fevers or chills.  Her son reports a loose/soft BM this am that the patient described a dark with a possible trace of blood.  She denies CP or SOB.  No recent sick contacts.  She denies abdominal pain when asked, however on exam seems to have some tenderness in lower and left lower abdomen.  No report of any urinary symptoms.     08/22/2023  More awake and alert this AM  Lab improve  Renal fxn improving  Want to try and eat something      08/23/2023   Urine culture gram neg ashley > 100,000    Hospice to meet at home  Will cont po cipro course for UTI    STATUS  Improved, Stable    DISPOSITION  Discharge to home     DIET      ACTIVITY  As tolerated      FOLLOW-UP      Follow-up Information       [facility] Follow up.    Specialties: Hospice and Palliative Medicine, Hospice Services  Contact information:  [address] [place] [address] A  [place] [address] [phone]                               DISCHARGE MEDICATION RECONCILIATION        Medication List        START taking these medications      ciprofloxacin HCl 500 MG tablet  Commonly known as: CIPRO  Take 1 tablet (500 mg total) by mouth every 12 (twelve) hours. for 7 days     fluconazole 100 MG tablet  Commonly known as: DIFLUCAN  Take 1 tablet (100 mg total) by mouth once daily. for 5  days  Start taking on: August 25, 2023            CONTINUE taking these medications      amLODIPine 5 MG tablet  Commonly known as: NORVASC  Take 1 tablet (5 mg total) by mouth once daily.     atorvastatin 10 MG tablet  Commonly known as: LIPITOR  TAKE ONE TABLET BY MOUTH EVERY EVENING FOR high cholesterol     clopidogreL 75 mg tablet  Commonly known as: PLAVIX     FIBER THERAPY (PSYLLIUM) ORAL     glipiZIDE 5 MG tablet  Commonly known as: GLUCOTROL  Take 2 tablets (10 mg total) by mouth once daily.     hydroCHLOROthiazide 25 MG tablet  Commonly known as: HYDRODIURIL  TAKE ONE TABLET BY MOUTH EVERY DAY     levothyroxine 75 MCG tablet  Commonly known as: SYNTHROID  TAKE ONE TABLET BY MOUTH EVERY MORNING     lisinopriL 20 MG tablet  Commonly known as: PRINIVIL,ZESTRIL  TAKE ONE TABLET BY MOUTH ONCE daily     magnesium 200 mg Tab     mirtazapine 15 MG tablet  Commonly known as: REMERON  TAKE ONE TABLET BY MOUTH EVERY EVENING FOR sleep/depression     pantoprazole 40 MG tablet  Commonly known as: PROTONIX  Take 1 tablet (40 mg total) by mouth once daily.     sertraline 100 MG tablet  Commonly known as: ZOLOFT  Take 1 tablet (100 mg total) by mouth once daily.     spironolactone 25 MG tablet  Commonly known as: ALDACTONE  TAKE ONE TABLET BY MOUTH ONCE daily     traZODone 50 MG tablet  Commonly known as: DESYREL  Take 1 tablet (50 mg total) by mouth every evening.               Where to Get Your Medications        These medications were sent to [facility]- [address] [address]      Phone: [phone]   ciprofloxacin HCl 500 MG tablet  fluconazole 100 MG tablet           PHYSICAL EXAM   VITALS: T 97.4 °F (36.3 °C)   BP (!) 148/55   P 68   RR 18   O2 96 %    Physical Exam  Vitals reviewed.   HENT:      Head: Normocephalic.   Cardiovascular:      Rate and Rhythm: Normal rate.   Pulmonary:      Effort: Pulmonary effort is normal.   Neurological:      Mental  "Status: She is alert.              Discharge time: 33 minutes     Dalton Thomas MD  [facility] Medicine       DIAGNOSITCS   CBC:   Recent Labs   Lab 08/22/23  0504 08/23/23  0508 08/24/23  0451   WBC 9.64 9.71 7.84   HGB 10.9* 11.0* 9.8*   HCT 34.8* 34.6* 30.7*    149 149     COAG:  No results for input(s): "APTT", "INR", "PTT" in the last 168 hours.  CMP:   Recent Labs   Lab 08/21/23  1517 08/22/23  0504 08/23/23  0508 08/24/23  0451   CALCIUM 9.4 8.4 8.1* 8.0*   ALBUMIN 3.9 2.9*  --   --     137 136 137   K 5.1 4.5 4.2 4.7   CO2 25 21 22 24   BUN 44.0* 38.0* 23.0* 18.0   CREATININE 2.06* 1.60* 1.32* 1.12   ALKPHOS 74 59  --   --    ALT 34 26  --   --    AST 97* 109*  --   --    BILITOT 0.5 0.3  --   --      Estimated Creatinine Clearance: 36.9 mL/min (based on SCr of 1.12 mg/dL).  CARDIAC ENZYMES:   Recent Labs     08/21/23  1517 08/22/23  0504   TROPONINI 0.025  --    CPK 1,557* 2,201*        Recent Labs   Lab 08/21/23  1517   LIPASE 41         Recent Labs     08/23/23  0707 08/23/23  1102 08/23/23  1609 08/23/23  2126 08/24/23  0724 08/24/23  1107   POCTGLUCOSE 164* 252* 177* 183* 177* 205*        Microbiology Results (last 7 days)       Procedure Component Value Units Date/Time    Urine culture [[id number]]  (Abnormal)  (Susceptibility) Collected: 08/21/23 1643    Order Status: Completed Specimen: Urine, Catheterized Updated: 08/24/23 0651     Urine Culture >/= 100,000 colonies/ml Proteus mirabilis               X-Ray Chest AP Portable    Result Date: 8/21/2023  EXAMINATION: STUDY: XR CHEST AP PORTABLE CLINICAL HISTORY AND TECHNIQUE: Arvind Dailey, RT on 8/21/2023  3:43 PM CLINICAL HX: ER PT X 1 WEEK C/O GENERALZIED WEAKNESS, LOSS OF APPETITE, NAUSEA PAST MEDICAL HX: CV (+) TECHNIQUE: 1V PORTABLE CHEST TECH: AKG/DB/NN PT TRANSPORTED WO INCIDENT COMPARISON: None FINDINGS: The lungs are slightly under expanded.The cardiac, hilar, and mediastinal contours appear unremarkable.I see no lobar or " segmental infiltrates.No significant pleural effusions are noted.There is moderate demineralization of the skeletal structures with moderate degenerative changes noted throughout the thoracic spine.  Atherosclerotic calcifications are noted within the aortic arch.     1. I see no lobar or segmental infiltrates or other significant abnormalities.See above comments. Electronically signed by: Gonzalez Babin Date:    08/21/2023 Time:    15:51    CT Abdomen Pelvis  Without Contrast    Result Date: 8/21/2023  EXAMINATION: CT ABDOMEN PELVIS WITHOUT CONTRAST CLINICAL HISTORY AND TECHNIQUE: Anamaria Lawson, RT on 8/21/2023  3:33 PM PT STATUS: ER PROCEDURE: CT ABD/PELVIS WO CLINICAL HX : X'S 1 WEEK, WEAKNESS, BLOOD IN STOOL & NAUSEA PMH: CVA IV CONTRAST: NONE ORAL CONTRAST: NONE RECTAL CONTRAST: NONE AXIAL IMAGES @ 5MM INTERVALS WITH MULTIPLANAR RECONSTRUCTION TOTAL IMAGE NUMBER: [id number] NUMBER OF CT SCANS IN PAST 12 MONTHS: 1 CTDIvol(mGy): HEAD:     BODY: 6.70 DLP(mGycm): HEAD:     BODY:  364.20 TECH: AKG/DB PT TRANSPORTED W/O INCIDENT This patient has had 1 CT and nuclear medicine scans performed within the last 12 months. The following DOSE REDUCTION TECHNIQUES are used for all CT scans at [facility]: 1. Automated exposure control. 2. Adjustment of the mA and/or kv according to patient size. 3. Use of iterative reconstruction technique. COMPARISON: 08/28/2020 FINDINGS: Liver: No clinically significant abnormalities are noted. Gallbladder/biliary system: Previous cholecystectomy. Spleen: Vascular calcifications are noted within the splenic artery and splenic hilum.  Coarse parenchymal calcifications are noted within the splenic parenchyma as can be seen with old granulomatous disease. Adrenal glands: No clinically significant abnormalities are noted. Pancreas: The pancreas is moderately atrophic with no worrisome inflammatory changes or focal masses appreciated. Kidneys/ureters: There is moderate atrophy of  both kidneys with no worrisome focal parenchymal abnormalities appreciated.  I see no obvious ureteral stones or changes to suggest ureteral obstruction. Urinary bladder: The urinary bladder is poorly distended and difficult to evaluate with no definite abnormalities noted. Uterus and ovaries: The patient is post hysterectomy. GI tract: Unopacified loops of large and small bowel as well as the gastric lumen and appendix are difficult to evaluate with no worrisome abnormalities noted. Vascular structures: Moderate atherosclerotic plaquing is noted throughout the abdominal aorta is primary branches. Musculoskeletal structures: There is significant demineralization of the skeletal structures with a moderate, levoconvex, scoliotic curvature of the lumbar spine and fairly prominent degenerative changes noted involving the lower thoracic and lumbar spine. Miscellaneous: Electronic stimulator is noted within the subcutaneous fat of the right flank with the distal tip of the lead located within the region of the left sacral wing.     1. Chronic changes are present as described above and as seen previously.  See above comments. Electronically signed by: Gonzalez Babin Date:    08/21/2023 Time:    15:38

## 2023-08-24 NOTE — PHYSICIAN QUERY
PT Name: Mariajose Allen  MR #: [id number]     DOCUMENTATION CLARIFICATION     CDS/: Kim Reeves RN           Contact information: [email or web address]     This form is a permanent document in the medical record.     Query Date: August 24, 2023    By submitting this query, we are merely seeking further clarification of documentation.  Please utilize your independent clinical judgment when addressing the question(s) below.    The Medical Record contains the following:   Indicators   Supporting Clinical Findings Location in Medical Record    Non-blanchable erythema/redness      Ulcer/Injury/Skin Breakdown      Deep Tissue Injury     x Wound care consult  Altered Skin Integrity 08/21/23 1730 Left lower Breast #5 Moisture associated dermatitis   Wound Image               Altered Skin Integrity 08/21/23 1730 Right lower Breast #6 Moisture associated dermatitis   Wound Image         Wound care note of 8/22                                                    Wound care note of 8/22                                    x Acute/Chronic Illness Acute Illness: non traumatic rhabdomyolysis, acute on chronic renal failure, left lwoer abdominal pain, nausea, Hypothyroidism, Hx of CVA, HTN      HTN, hx of CVA, Hypothyroidism, GERD, possible hx of Dementia who presented with nausea and decreased appetite   H & P of 8/21        H & P of 8/21    x Medication/Treatment Applied:  (antifungal cream)   Wound care note of 8/22     Other       The clinical guidelines noted are only a system guideline. It does not replace the providers clinical judgment.    Per the National Pressure Injury Advisory Panel:   A pressure injury is localized damage to the skin and underlying soft tissue usually over a bony prominence or related to a medical or other device. The injury can present as intact skin or an open ulcer and may be painful. The injury occurs as a result of intense and/or prolonged pressure or pressure in combination with shear.  The tolerance of soft tissue for pressure and shear may also be affected by microclimate, nutrition, perfusion, co-morbidities and condition of the soft tissue.       Stage 1 Pressure Injury:  Intact skin with a localized area of non-blanchable erythema, which may appear differently in darkly pigmented skin. Color changes do not include purple or maroon discoloration; these may indicate deep tissue pressure injury.    Stage 2 Pressure Injury:  Partial-thickness loss of skin with exposed dermis. The wound bed is viable, pink or red, moist, and may also present as an intact or ruptured serum-filled blister.    Stage 3 Pressure Injury:  Full-thickness loss of skin, in which adipose (fat) is visible in the ulcer and granulation tissue and epibole (rolled wound edges) are often present. Slough and/or eschar may be visible. Undermining and tunneling may occur.    Stage 4 Pressure Injury:  Full-thickness skin and tissue loss with exposed or directly palpable fascia, muscle, tendon, ligament, cartilage or bone in the ulcer. Slough and/or eschar may be visible. Epibole (rolled edges), undermining and/or tunneling often occur.    Unstageable Pressure Injury:  Full-thickness skin and tissue loss in which the extent of tissue damage within the ulcer cannot be confirmed because it is obscured by slough or eschar. If slough or eschar is removed, a Stage 3 or Stage 4 pressure injury will be revealed.    Deep Tissue Pressure Injury:  Intact or non-intact skin with localized area of persistent non-blanchable deep red, maroon, purple discoloration or epidermal separation revealing a dark wound bed or blood filled blister. This injury results from intense and/or prolonged pressure and shear forces at the bone-muscle interface. The wound may evolve rapidly to reveal the actual extent of tissue injury, or may resolve without tissue loss. If necrotic tissue, subcutaneous tissue, granulation tissue, fascia, muscle or other underlying  structures are visible, this indicates a full thickness pressure injury (Unstageable, Stage 3 or Stage 4). Do not use DTPI to describe vascular, traumatic, neuropathic, or dermatologic conditions.   Medical Device Related Pressure Injury: This describes an etiology. Medical device related pressure injuries result from the use of devices designed and applied for diagnostic or therapeutic purposes. The resultant pressure injury generally conforms to the pattern or shape of the device. The injury should be staged using the staging system.    Mucosal Membrane Pressure Injury: Mucosal membrane pressure injury is found on mucous membranes with a history of a medical device in use at the location of the injury. Due to the anatomy of the tissue these ulcers cannot be staged.           Provider, please provide the integumentary diagnosis related to the documentation of Left lower breast.       [X   ] Moisture associated dermatitis      [   ] Other Integumentary Diagnosis (please specify):______________      [  ] Clinically Undetermined            Provider, please provide the integumentary diagnosis related to the documentation of right lower breast.       [   ] Moisture associated dermatitis      [   ] Other Integumentary Diagnosis (please specify):______________      [  ] Clinically Undetermined      Please document in your progress notes daily for the duration of treatment until resolved and include in your discharge summary.    Reference:    ELIAS Posada., Macario, J. MARILIA., Goldberg, M., KANNAN Christiansen., KANNAN Dutta., & MARILIA Adkins. (2016). Revised National Pressure Ulcer Advisory Panel Pressure Injury Staging System: Revised Pressure Injury Staging System. J Wound Ostomy Continence Nurs, 43(6), 585-597. doi:10.1097/won.3458069809496527    Form No.[id number]

## 2023-08-24 NOTE — PHYSICIAN QUERY
PT Name: Mariajose Allen  MR #: [id number]     DOCUMENTATION CLARIFICATION     CDS/: Kim Reeves RN           Contact information: [email or web address]   This form is a permanent document in the medical record.     Query Date: August 24, 2023    By submitting this query, we are merely seeking further clarification of documentation.  Please utilize your independent clinical judgment when addressing the question(s) below.    The Medical Record contains the following:   Indicators   Supporting Clinical Findings Location in Medical Record    Non-blanchable erythema/redness      Ulcer/Injury/Skin Breakdown      Deep Tissue Injury     x Wound care consult  Altered Skin Integrity 08/21/23 1730 Left medial Buttocks #2 Partial thickness tissue loss. Shallow open ulcer with a red or pink wound bed, without slough. Intact or Open/Ruptured Serum-filled blister.   Wound Image                             Wound care note of 8/22    x Acute/Chronic Illness   Acute Illness: non traumatic rhabdomyolysis, acute on chronic renal failure, left lwoer abdominal pain, nausea, Hypothyroidism, Hx of CVA, HTN      HTN, hx of CVA, Hypothyroidism, GERD, possible hx of Dementia who presented with nausea and decreased appetite   H & P of 8/21        H & P of 8/21    x Medication/Treatment Cleansed with:;Sterile normal saline   Dressing Applied;Foam     Wound care note of 8/22    x Other Periwound Area Purple  (blanchable.  This purple discoloration extends to bilateral buttocks.)    Partial thickness tissue loss. Shallow open ulcer with a red or pink wound bed, without slough. Intact or Open/Ruptured Serum-filled blister.     Wound care note of 8/22       Wound care note of 8/22      The clinical guidelines noted are only a system guideline. It does not replace the providers clinical judgment.    Per the National Pressure Injury Advisory Panel:   A pressure injury is localized damage to the skin and underlying soft tissue usually  over a bony prominence or related to a medical or other device. The injury can present as intact skin or an open ulcer and may be painful. The injury occurs as a result of intense and/or prolonged pressure or pressure in combination with shear. The tolerance of soft tissue for pressure and shear may also be affected by microclimate, nutrition, perfusion, co-morbidities and condition of the soft tissue.       Stage 1 Pressure Injury:  Intact skin with a localized area of non-blanchable erythema, which may appear differently in darkly pigmented skin. Color changes do not include purple or maroon discoloration; these may indicate deep tissue pressure injury.    Stage 2 Pressure Injury:  Partial-thickness loss of skin with exposed dermis. The wound bed is viable, pink or red, moist, and may also present as an intact or ruptured serum-filled blister.    Stage 3 Pressure Injury:  Full-thickness loss of skin, in which adipose (fat) is visible in the ulcer and granulation tissue and epibole (rolled wound edges) are often present. Slough and/or eschar may be visible. Undermining and tunneling may occur.    Stage 4 Pressure Injury:  Full-thickness skin and tissue loss with exposed or directly palpable fascia, muscle, tendon, ligament, cartilage or bone in the ulcer. Slough and/or eschar may be visible. Epibole (rolled edges), undermining and/or tunneling often occur.    Unstageable Pressure Injury:  Full-thickness skin and tissue loss in which the extent of tissue damage within the ulcer cannot be confirmed because it is obscured by slough or eschar. If slough or eschar is removed, a Stage 3 or Stage 4 pressure injury will be revealed.    Deep Tissue Pressure Injury:  Intact or non-intact skin with localized area of persistent non-blanchable deep red, maroon, purple discoloration or epidermal separation revealing a dark wound bed or blood filled blister. This injury results from intense and/or prolonged pressure and shear  forces at the bone-muscle interface. The wound may evolve rapidly to reveal the actual extent of tissue injury, or may resolve without tissue loss. If necrotic tissue, subcutaneous tissue, granulation tissue, fascia, muscle or other underlying structures are visible, this indicates a full thickness pressure injury (Unstageable, Stage 3 or Stage 4). Do not use DTPI to describe vascular, traumatic, neuropathic, or dermatologic conditions.   Medical Device Related Pressure Injury: This describes an etiology. Medical device related pressure injuries result from the use of devices designed and applied for diagnostic or therapeutic purposes. The resultant pressure injury generally conforms to the pattern or shape of the device. The injury should be staged using the staging system.    Mucosal Membrane Pressure Injury: Mucosal membrane pressure injury is found on mucous membranes with a history of a medical device in use at the location of the injury. Due to the anatomy of the tissue these ulcers cannot be staged.       Provider, please provide the integumentary diagnosis related to the documentation of Left medial buttock:      [ X  ] Pressure Injury/Decubitus Ulcer, Stage 2     [   ] Deep Tissue Pressure Injury     [   ] Other Integumentary Diagnosis (please specify):______________     [  ] Clinically Undetermined       Present on admission (POA) status:        Please document in your progress notes daily for the duration of treatment until resolved and include in your discharge summary.    Reference:    ELIAS Posada., Macario, TOMY CAPELLAN., Goldberg, M., KANNAN Christiansen., KANNAN Dutta., & MARILIA Adkins. (2016). Revised National Pressure Ulcer Advisory Panel Pressure Injury Staging System: Revised Pressure Injury Staging System. J Wound Ostomy Continence Nurs, 43(6), 585-597. doi:10.1097/won.[id number]    Form No.[id number]